# Patient Record
Sex: MALE | Race: WHITE | NOT HISPANIC OR LATINO | Employment: OTHER | URBAN - METROPOLITAN AREA
[De-identification: names, ages, dates, MRNs, and addresses within clinical notes are randomized per-mention and may not be internally consistent; named-entity substitution may affect disease eponyms.]

---

## 2024-02-09 ENCOUNTER — HOSPITAL ENCOUNTER (OUTPATIENT)
Facility: CLINIC | Age: 79
End: 2024-02-09
Attending: ORTHOPAEDIC SURGERY | Admitting: ORTHOPAEDIC SURGERY
Payer: COMMERCIAL

## 2024-02-09 VITALS — BODY MASS INDEX: 23.7 KG/M2 | WEIGHT: 160 LBS | HEIGHT: 69 IN

## 2024-02-09 RX ORDER — LISINOPRIL 10 MG/1
10 TABLET ORAL DAILY
COMMUNITY
End: 2024-03-01 | Stop reason: HOSPADM

## 2024-02-09 RX ORDER — SIMVASTATIN 20 MG
20 TABLET ORAL AT BEDTIME
COMMUNITY
End: 2024-03-01 | Stop reason: HOSPADM

## 2024-02-09 RX ORDER — TAMSULOSIN HYDROCHLORIDE 0.4 MG/1
0.4 CAPSULE ORAL DAILY
COMMUNITY
End: 2024-03-01 | Stop reason: HOSPADM

## 2024-02-14 NOTE — PROGRESS NOTES
Discharge plan according to [facility]:     02/09/24 1326   Discharge Planning   Patient/Family Anticipates Transition to home   Concerns to be Addressed all concerns addressed in this encounter   Living Arrangements   People in Home spouse   Type of Residence Private Residence   Is your private residence a single family home or apartment? Single family home   Stair Railings, Within Home, Primary railings safe and in good condition   Bathroom Shower/Tub Walk-in shower   Equipment Currently Used at Home cane, straight   Support System   Support Systems Spouse/Significant Other   Do you have someone available to stay with you one or two nights once you are home? Yes

## 2024-02-26 RX ORDER — TRANEXAMIC ACID 650 MG/1
1950 TABLET ORAL ONCE
Status: CANCELLED | OUTPATIENT
Start: 2024-02-26 | End: 2024-02-26

## 2024-02-26 RX ORDER — ACETAMINOPHEN 325 MG/1
975 TABLET ORAL ONCE
Status: CANCELLED | OUTPATIENT
Start: 2024-02-26 | End: 2024-02-26

## 2024-02-26 RX ORDER — CEFAZOLIN SODIUM/WATER 2 G/20 ML
2 SYRINGE (ML) INTRAVENOUS SEE ADMIN INSTRUCTIONS
Status: CANCELLED | OUTPATIENT
Start: 2024-02-26

## 2024-02-26 RX ORDER — CEFAZOLIN SODIUM/WATER 2 G/20 ML
2 SYRINGE (ML) INTRAVENOUS
Status: CANCELLED | OUTPATIENT
Start: 2024-02-26

## 2024-04-17 RX ORDER — LISINOPRIL 10 MG/1
10 TABLET ORAL AT BEDTIME
COMMUNITY

## 2024-04-17 RX ORDER — TAMSULOSIN HYDROCHLORIDE 0.4 MG/1
0.8 CAPSULE ORAL DAILY
COMMUNITY

## 2024-04-17 RX ORDER — SIMVASTATIN 20 MG
20 TABLET ORAL AT BEDTIME
COMMUNITY

## 2024-04-22 NOTE — PROGRESS NOTES
Discharge plan according to [facility]:       04/17/24 1226   Discharge Planning   Patient/Family Anticipates Transition to home   Living Arrangements   People in Home spouse   Type of Residence Private Residence   Is your private residence a single family home or apartment? Single family home   Number of Stairs, Within Home, Primary ten   Stair Railings, Within Home, Primary railings safe and in good condition   Bathroom Shower/Tub Walk-in shower   Equipment Currently Used at Home none   Support System   Support Systems Spouse/Significant Other   Do you have someone available to stay with you one or two nights once you are home? Yes

## 2024-05-10 NOTE — TREATMENT PLAN
Orthopedic Surgery Pre-Op Plan: Vik Veliz  pre-op review. This is NOT an H&P   Surgeon: Dr. Oden    [facility]  Name of Surgery: Left Direct Anterior Total Hip Arthroplasty   Date of Surgery: 5/13/24  H&P: Completed on 4/23/24 by PAOLA Navarrete, CNP at [facility].   History of ASA, NSAIDS, vitamin and/or herbal supplements, GLP-1 Agonist or SGLT Inhibitor medication taken within 10 days?: No  History of blood thinners?: No    Plan:   1) Discharge Plan: Home POD 1 with assist of Spouse. Please see Discharge Planning section near bottom of this note for further details.     2) Premature Atrial Contractions (PAC's): Reviewed recent Cardiology visit note with Dr. Radha Graham at [facility] from 4/4/24: patient has history of PAC's with previous burden of 6% on Holter in 2018. He is asymptomatic. Dr. Graham recommended Echocardiogram and Event Monitor test but said that these tests do NOT need to be done before upcoming hip surgery. Echo and event monitor tests were both completed (results below) and showed no significant change from previous. Patient felt to be medically optimized for surgery.     Echocardiogram 4/2/24:  Summary    1. Normal LV size with normal wall thickness. Calculated biplane LVEF 60-65%. No regional wall motion abnormalities.    2. Normal RV size with normal systolic function. Estimated PASP is normal.    3. Biatrial enlargement.    4. The aortic valve is trileaflet with mild regurgitation.    5. The aortic root and ascending aorta are mildly dilated at 4.2 cm and 4.1 cm, respectively.    6. Compared to the prior study from 1/25/2019, the current study reveals no significant change.    Cardiac Event Monitor 5/9/24:  Sinus rhythm - range  bpm  No symptom related events  Confirmed by Demarcus Vásquez ([id number]) on 5/9/2024 9:25:19 AM    3) Hyperlipidemia: On simvastatin.    4) Hypertension: Appears well-controlled on  lisinopril.  Patient was instructed to hold lisinopril on the morning of surgery.     5) Benign Prostatic Hyperplasia (BPH): On tamsulosin.  Patient is at increased risk for postop urinary retention.  I recommend continuing on tamsulosin perioperatively and close monitoring for urine retention with frequent bladder scanning as needed.     Patient appears medically optimized for upcoming surgery. I would recommend Hospitalist Consult to assist with medical management. Please call me below with any questions on this patient.       Review of Systems Notable for: Premature atrial contractions-cleared by PCP and cardiology for surgery, hyperlipidemia, hypertension, benign prostatic hyperplasia.     Past Medical History:   Past Medical History:   Diagnosis Date    Arthritis     BPH (benign prostatic hyperplasia)     Complication of anesthesia     Hypertension      History reviewed. No pertinent surgical history.    Current Medications:  Patient's Medications   New Prescriptions    No medications on file   Previous Medications    LISINOPRIL (ZESTRIL) 10 MG TABLET    Take 10 mg by mouth daily    SIMVASTATIN (ZOCOR) 20 MG TABLET    Take 20 mg by mouth at bedtime    TAMSULOSIN (FLOMAX) 0.4 MG CAPSULE    Take 0.4 mg by mouth daily   Modified Medications    No medications on file   Discontinued Medications    No medications on file       ALLERGIES:  No Known Allergies    Social History  Social History     Tobacco Use    Smoking status: Never    Smokeless tobacco: Never   Substance Use Topics    Alcohol use: Yes     Comment: 14 etoh per week    Drug use: Never       Any Abnormal Recent Diagnostics? Yes  Blood glucose 107 on 4/23/2024: No known history of prediabetes or diabetes.  Last hemoglobin A1c 5.2 on 2/19/2024.    Discharge Planning:   Discharge plan according to [facility]:     Home POD 1 with assist of Spouse     04/17/24 1226   Discharge Planning   Patient/Family Anticipates Transition to home   Living  Arrangements   People in Home spouse   Type of Residence Private Residence   Is your private residence a single family home or apartment? Single family home   Number of Stairs, Within Home, Primary ten   Stair Railings, Within Home, Primary railings safe and in good condition   Bathroom Shower/Tub Walk-in shower   Equipment Currently Used at Home none   Support System   Support Systems Spouse/Significant Other   Do you have someone available to stay with you one or two nights once you are home? Yes       PAOLA Shah, CNP   Advanced Practice Nurse Navigator- Orthopedics  [facility]   Phone: [phone]

## 2024-05-11 ENCOUNTER — ANESTHESIA EVENT (OUTPATIENT)
Dept: SURGERY | Facility: CLINIC | Age: 79
End: 2024-05-11
Payer: COMMERCIAL

## 2024-05-13 ENCOUNTER — APPOINTMENT (OUTPATIENT)
Dept: RADIOLOGY | Facility: CLINIC | Age: 79
End: 2024-05-13
Payer: COMMERCIAL

## 2024-05-13 ENCOUNTER — HOSPITAL ENCOUNTER (OUTPATIENT)
Facility: CLINIC | Age: 79
Discharge: HOME | End: 2024-05-14
Attending: ORTHOPAEDIC SURGERY | Admitting: ORTHOPAEDIC SURGERY
Payer: COMMERCIAL

## 2024-05-13 ENCOUNTER — ANESTHESIA (OUTPATIENT)
Dept: SURGERY | Facility: CLINIC | Age: 79
End: 2024-05-13
Payer: COMMERCIAL

## 2024-05-13 ENCOUNTER — DOCUMENTATION ONLY (OUTPATIENT)
Dept: OTHER | Facility: CLINIC | Age: 79
End: 2024-05-13
Payer: COMMERCIAL

## 2024-05-13 ENCOUNTER — APPOINTMENT (OUTPATIENT)
Dept: PHYSICAL THERAPY | Facility: CLINIC | Age: 79
End: 2024-05-13
Payer: COMMERCIAL

## 2024-05-13 DIAGNOSIS — Z47.1 AFTERCARE FOLLOWING LEFT HIP JOINT REPLACEMENT SURGERY: Primary | ICD-10-CM

## 2024-05-13 DIAGNOSIS — Z96.642 AFTERCARE FOLLOWING LEFT HIP JOINT REPLACEMENT SURGERY: Primary | ICD-10-CM

## 2024-05-13 PROBLEM — M16.12 OSTEOARTHRITIS OF LEFT HIP: Status: ACTIVE | Noted: 2024-05-13

## 2024-05-13 PROCEDURE — 97530 THERAPEUTIC ACTIVITIES: CPT | Mod: GP

## 2024-05-13 PROCEDURE — 250N000013 HC RX MED GY IP 250 OP 250 PS 637: Performed by: EMERGENCY MEDICINE

## 2024-05-13 PROCEDURE — 360N000077 HC SURGERY LEVEL 4, PER MIN: Performed by: ORTHOPAEDIC SURGERY

## 2024-05-13 PROCEDURE — 250N000013 HC RX MED GY IP 250 OP 250 PS 637

## 2024-05-13 PROCEDURE — 258N000003 HC RX IP 258 OP 636: Performed by: ANESTHESIOLOGY

## 2024-05-13 PROCEDURE — 250N000009 HC RX 250

## 2024-05-13 PROCEDURE — 250N000011 HC RX IP 250 OP 636: Performed by: STUDENT IN AN ORGANIZED HEALTH CARE EDUCATION/TRAINING PROGRAM

## 2024-05-13 PROCEDURE — 999N000141 HC STATISTIC PRE-PROCEDURE NURSING ASSESSMENT: Performed by: ORTHOPAEDIC SURGERY

## 2024-05-13 PROCEDURE — 97161 PT EVAL LOW COMPLEX 20 MIN: CPT | Mod: GP

## 2024-05-13 PROCEDURE — 999N000065 XR PELVIS AND HIP PORTABLE LEFT 1 VIEW

## 2024-05-13 PROCEDURE — C1776 JOINT DEVICE (IMPLANTABLE): HCPCS | Performed by: ORTHOPAEDIC SURGERY

## 2024-05-13 PROCEDURE — 258N000003 HC RX IP 258 OP 636

## 2024-05-13 PROCEDURE — 250N000011 HC RX IP 250 OP 636

## 2024-05-13 PROCEDURE — 99222 1ST HOSP IP/OBS MODERATE 55: CPT | Performed by: EMERGENCY MEDICINE

## 2024-05-13 PROCEDURE — 272N000001 HC OR GENERAL SUPPLY STERILE: Performed by: ORTHOPAEDIC SURGERY

## 2024-05-13 PROCEDURE — 710N000010 HC RECOVERY PHASE 1, LEVEL 2, PER MIN: Performed by: ORTHOPAEDIC SURGERY

## 2024-05-13 PROCEDURE — 370N000017 HC ANESTHESIA TECHNICAL FEE, PER MIN: Performed by: ORTHOPAEDIC SURGERY

## 2024-05-13 PROCEDURE — 250N000009 HC RX 250: Performed by: ORTHOPAEDIC SURGERY

## 2024-05-13 DEVICE — IMPLANTABLE DEVICE: Type: IMPLANTABLE DEVICE | Site: HIP | Status: FUNCTIONAL

## 2024-05-13 DEVICE — CERAMIC V40 FEMORAL HEAD
Type: IMPLANTABLE DEVICE | Site: HIP | Status: FUNCTIONAL
Brand: BIOLOX

## 2024-05-13 DEVICE — HIP STEM - HIGH OFFSET
Type: IMPLANTABLE DEVICE | Site: HIP | Status: FUNCTIONAL
Brand: INSIGNIA

## 2024-05-13 RX ORDER — HYDROXYZINE HYDROCHLORIDE 10 MG/1
10 TABLET, FILM COATED ORAL EVERY 6 HOURS PRN
Qty: 30 TABLET | Refills: 0 | Status: SHIPPED | OUTPATIENT
Start: 2024-05-13

## 2024-05-13 RX ORDER — ASPIRIN 81 MG/1
81 TABLET ORAL 2 TIMES DAILY
Status: DISCONTINUED | OUTPATIENT
Start: 2024-05-13 | End: 2024-05-14 | Stop reason: HOSPADM

## 2024-05-13 RX ORDER — ONDANSETRON 2 MG/ML
4 INJECTION INTRAMUSCULAR; INTRAVENOUS EVERY 30 MIN PRN
Status: DISCONTINUED | OUTPATIENT
Start: 2024-05-13 | End: 2024-05-13 | Stop reason: HOSPADM

## 2024-05-13 RX ORDER — TAMSULOSIN HYDROCHLORIDE 0.4 MG/1
0.8 CAPSULE ORAL EVERY EVENING
Status: DISCONTINUED | OUTPATIENT
Start: 2024-05-13 | End: 2024-05-14 | Stop reason: HOSPADM

## 2024-05-13 RX ORDER — SODIUM CHLORIDE, SODIUM LACTATE, POTASSIUM CHLORIDE, CALCIUM CHLORIDE 600; 310; 30; 20 MG/100ML; MG/100ML; MG/100ML; MG/100ML
INJECTION, SOLUTION INTRAVENOUS CONTINUOUS
Status: DISCONTINUED | OUTPATIENT
Start: 2024-05-13 | End: 2024-05-13 | Stop reason: HOSPADM

## 2024-05-13 RX ORDER — TRANEXAMIC ACID 650 MG/1
1950 TABLET ORAL ONCE
Status: COMPLETED | OUTPATIENT
Start: 2024-05-13 | End: 2024-05-13

## 2024-05-13 RX ORDER — ACETAMINOPHEN 325 MG/1
975 TABLET ORAL ONCE
Status: COMPLETED | OUTPATIENT
Start: 2024-05-13 | End: 2024-05-13

## 2024-05-13 RX ORDER — ACETAMINOPHEN 325 MG/1
975 TABLET ORAL EVERY 8 HOURS
Qty: 27 TABLET | Refills: 0 | Status: DISCONTINUED | OUTPATIENT
Start: 2024-05-13 | End: 2024-05-14 | Stop reason: HOSPADM

## 2024-05-13 RX ORDER — CEFAZOLIN SODIUM 1 G/3ML
1 INJECTION, POWDER, FOR SOLUTION INTRAMUSCULAR; INTRAVENOUS EVERY 8 HOURS
Qty: 10 ML | Refills: 0 | Status: COMPLETED | OUTPATIENT
Start: 2024-05-13 | End: 2024-05-13

## 2024-05-13 RX ORDER — PROPOFOL 10 MG/ML
INJECTION, EMULSION INTRAVENOUS CONTINUOUS PRN
Status: DISCONTINUED | OUTPATIENT
Start: 2024-05-13 | End: 2024-05-13

## 2024-05-13 RX ORDER — OXYCODONE HYDROCHLORIDE 5 MG/1
10 TABLET ORAL
Status: DISCONTINUED | OUTPATIENT
Start: 2024-05-13 | End: 2024-05-13 | Stop reason: HOSPADM

## 2024-05-13 RX ORDER — NALOXONE HYDROCHLORIDE 0.4 MG/ML
0.1 INJECTION, SOLUTION INTRAMUSCULAR; INTRAVENOUS; SUBCUTANEOUS
Status: DISCONTINUED | OUTPATIENT
Start: 2024-05-13 | End: 2024-05-13 | Stop reason: HOSPADM

## 2024-05-13 RX ORDER — CEFADROXIL 500 MG/1
500 CAPSULE ORAL 2 TIMES DAILY
Qty: 14 CAPSULE | Refills: 0 | Status: SHIPPED | OUTPATIENT
Start: 2024-05-13 | End: 2024-05-20

## 2024-05-13 RX ORDER — ACETAMINOPHEN 325 MG/1
650 TABLET ORAL EVERY 4 HOURS PRN
Qty: 100 TABLET | Refills: 0 | Status: SHIPPED | OUTPATIENT
Start: 2024-05-13

## 2024-05-13 RX ORDER — ONDANSETRON 2 MG/ML
INJECTION INTRAMUSCULAR; INTRAVENOUS PRN
Status: DISCONTINUED | OUTPATIENT
Start: 2024-05-13 | End: 2024-05-13

## 2024-05-13 RX ORDER — NALOXONE HYDROCHLORIDE 0.4 MG/ML
0.2 INJECTION, SOLUTION INTRAMUSCULAR; INTRAVENOUS; SUBCUTANEOUS
Status: DISCONTINUED | OUTPATIENT
Start: 2024-05-13 | End: 2024-05-14 | Stop reason: HOSPADM

## 2024-05-13 RX ORDER — LIDOCAINE 40 MG/G
CREAM TOPICAL
Status: DISCONTINUED | OUTPATIENT
Start: 2024-05-13 | End: 2024-05-13 | Stop reason: HOSPADM

## 2024-05-13 RX ORDER — POLYETHYLENE GLYCOL 3350 17 G/17G
17 POWDER, FOR SOLUTION ORAL DAILY
Status: DISCONTINUED | OUTPATIENT
Start: 2024-05-14 | End: 2024-05-14 | Stop reason: HOSPADM

## 2024-05-13 RX ORDER — OXYCODONE HYDROCHLORIDE 5 MG/1
5 TABLET ORAL
Status: DISCONTINUED | OUTPATIENT
Start: 2024-05-13 | End: 2024-05-13 | Stop reason: HOSPADM

## 2024-05-13 RX ORDER — FENTANYL CITRATE 50 UG/ML
25 INJECTION, SOLUTION INTRAMUSCULAR; INTRAVENOUS EVERY 5 MIN PRN
Status: DISCONTINUED | OUTPATIENT
Start: 2024-05-13 | End: 2024-05-13 | Stop reason: HOSPADM

## 2024-05-13 RX ORDER — AMOXICILLIN 250 MG
1-2 CAPSULE ORAL 2 TIMES DAILY
Qty: 30 TABLET | Refills: 0 | Status: SHIPPED | OUTPATIENT
Start: 2024-05-13

## 2024-05-13 RX ORDER — ASPIRIN 81 MG/1
81 TABLET ORAL 2 TIMES DAILY
Qty: 60 TABLET | Refills: 0 | Status: SHIPPED | OUTPATIENT
Start: 2024-05-13

## 2024-05-13 RX ORDER — OXYCODONE HYDROCHLORIDE 5 MG/1
10 TABLET ORAL EVERY 4 HOURS PRN
Status: DISCONTINUED | OUTPATIENT
Start: 2024-05-13 | End: 2024-05-14 | Stop reason: HOSPADM

## 2024-05-13 RX ORDER — SIMVASTATIN 20 MG
20 TABLET ORAL AT BEDTIME
Status: DISCONTINUED | OUTPATIENT
Start: 2024-05-13 | End: 2024-05-14 | Stop reason: HOSPADM

## 2024-05-13 RX ORDER — OXYCODONE HYDROCHLORIDE 5 MG/1
5 TABLET ORAL EVERY 4 HOURS PRN
Qty: 20 TABLET | Refills: 0 | Status: SHIPPED | OUTPATIENT
Start: 2024-05-13

## 2024-05-13 RX ORDER — BISACODYL 10 MG
10 SUPPOSITORY, RECTAL RECTAL DAILY PRN
Status: DISCONTINUED | OUTPATIENT
Start: 2024-05-16 | End: 2024-05-14 | Stop reason: HOSPADM

## 2024-05-13 RX ORDER — AMOXICILLIN 250 MG
1 CAPSULE ORAL 2 TIMES DAILY
Status: DISCONTINUED | OUTPATIENT
Start: 2024-05-13 | End: 2024-05-14 | Stop reason: HOSPADM

## 2024-05-13 RX ORDER — LISINOPRIL 10 MG/1
10 TABLET ORAL AT BEDTIME
Status: DISCONTINUED | OUTPATIENT
Start: 2024-05-13 | End: 2024-05-14 | Stop reason: HOSPADM

## 2024-05-13 RX ORDER — ONDANSETRON 2 MG/ML
4 INJECTION INTRAMUSCULAR; INTRAVENOUS EVERY 6 HOURS PRN
Status: DISCONTINUED | OUTPATIENT
Start: 2024-05-13 | End: 2024-05-14 | Stop reason: HOSPADM

## 2024-05-13 RX ORDER — DEXAMETHASONE SODIUM PHOSPHATE 4 MG/ML
4 INJECTION, SOLUTION INTRA-ARTICULAR; INTRALESIONAL; INTRAMUSCULAR; INTRAVENOUS; SOFT TISSUE
Status: DISCONTINUED | OUTPATIENT
Start: 2024-05-13 | End: 2024-05-13 | Stop reason: HOSPADM

## 2024-05-13 RX ORDER — HYDROMORPHONE HCL IN WATER/PF 6 MG/30 ML
0.2 PATIENT CONTROLLED ANALGESIA SYRINGE INTRAVENOUS EVERY 5 MIN PRN
Status: DISCONTINUED | OUTPATIENT
Start: 2024-05-13 | End: 2024-05-13 | Stop reason: HOSPADM

## 2024-05-13 RX ORDER — HYDROMORPHONE HCL IN WATER/PF 6 MG/30 ML
0.4 PATIENT CONTROLLED ANALGESIA SYRINGE INTRAVENOUS EVERY 5 MIN PRN
Status: DISCONTINUED | OUTPATIENT
Start: 2024-05-13 | End: 2024-05-13 | Stop reason: HOSPADM

## 2024-05-13 RX ORDER — LIDOCAINE HYDROCHLORIDE 10 MG/ML
INJECTION, SOLUTION INFILTRATION; PERINEURAL PRN
Status: DISCONTINUED | OUTPATIENT
Start: 2024-05-13 | End: 2024-05-13

## 2024-05-13 RX ORDER — HYDROMORPHONE HCL IN WATER/PF 6 MG/30 ML
0.2 PATIENT CONTROLLED ANALGESIA SYRINGE INTRAVENOUS
Status: DISCONTINUED | OUTPATIENT
Start: 2024-05-13 | End: 2024-05-14 | Stop reason: HOSPADM

## 2024-05-13 RX ORDER — LORAZEPAM 2 MG/ML
.5-1 INJECTION INTRAMUSCULAR
Status: DISCONTINUED | OUTPATIENT
Start: 2024-05-13 | End: 2024-05-13 | Stop reason: HOSPADM

## 2024-05-13 RX ORDER — ACETAMINOPHEN 325 MG/1
650 TABLET ORAL EVERY 4 HOURS PRN
Status: DISCONTINUED | OUTPATIENT
Start: 2024-05-16 | End: 2024-05-14 | Stop reason: HOSPADM

## 2024-05-13 RX ORDER — LIDOCAINE 40 MG/G
CREAM TOPICAL
Status: DISCONTINUED | OUTPATIENT
Start: 2024-05-13 | End: 2024-05-14 | Stop reason: HOSPADM

## 2024-05-13 RX ORDER — ONDANSETRON 4 MG/1
4 TABLET, ORALLY DISINTEGRATING ORAL EVERY 30 MIN PRN
Status: DISCONTINUED | OUTPATIENT
Start: 2024-05-13 | End: 2024-05-13 | Stop reason: HOSPADM

## 2024-05-13 RX ORDER — CEFAZOLIN SODIUM/WATER 2 G/20 ML
2 SYRINGE (ML) INTRAVENOUS SEE ADMIN INSTRUCTIONS
Status: DISCONTINUED | OUTPATIENT
Start: 2024-05-13 | End: 2024-05-13 | Stop reason: HOSPADM

## 2024-05-13 RX ORDER — FENTANYL CITRATE 50 UG/ML
25-100 INJECTION, SOLUTION INTRAMUSCULAR; INTRAVENOUS
Status: DISCONTINUED | OUTPATIENT
Start: 2024-05-13 | End: 2024-05-13 | Stop reason: HOSPADM

## 2024-05-13 RX ORDER — ONDANSETRON 4 MG/1
4 TABLET, ORALLY DISINTEGRATING ORAL EVERY 6 HOURS PRN
Status: DISCONTINUED | OUTPATIENT
Start: 2024-05-13 | End: 2024-05-14 | Stop reason: HOSPADM

## 2024-05-13 RX ORDER — NALOXONE HYDROCHLORIDE 0.4 MG/ML
0.4 INJECTION, SOLUTION INTRAMUSCULAR; INTRAVENOUS; SUBCUTANEOUS
Status: DISCONTINUED | OUTPATIENT
Start: 2024-05-13 | End: 2024-05-14 | Stop reason: HOSPADM

## 2024-05-13 RX ORDER — OXYCODONE HYDROCHLORIDE 5 MG/1
5 TABLET ORAL EVERY 4 HOURS PRN
Status: DISCONTINUED | OUTPATIENT
Start: 2024-05-13 | End: 2024-05-14 | Stop reason: HOSPADM

## 2024-05-13 RX ORDER — HYDROMORPHONE HYDROCHLORIDE 1 MG/ML
0.5 INJECTION, SOLUTION INTRAMUSCULAR; INTRAVENOUS; SUBCUTANEOUS
Status: DISCONTINUED | OUTPATIENT
Start: 2024-05-13 | End: 2024-05-14 | Stop reason: HOSPADM

## 2024-05-13 RX ORDER — PROCHLORPERAZINE MALEATE 5 MG
5 TABLET ORAL EVERY 6 HOURS PRN
Status: DISCONTINUED | OUTPATIENT
Start: 2024-05-13 | End: 2024-05-14 | Stop reason: HOSPADM

## 2024-05-13 RX ORDER — KETOROLAC TROMETHAMINE 30 MG/ML
15 INJECTION, SOLUTION INTRAMUSCULAR; INTRAVENOUS
Status: DISCONTINUED | OUTPATIENT
Start: 2024-05-13 | End: 2024-05-13 | Stop reason: HOSPADM

## 2024-05-13 RX ORDER — FENTANYL CITRATE 50 UG/ML
50 INJECTION, SOLUTION INTRAMUSCULAR; INTRAVENOUS EVERY 5 MIN PRN
Status: DISCONTINUED | OUTPATIENT
Start: 2024-05-13 | End: 2024-05-13 | Stop reason: HOSPADM

## 2024-05-13 RX ORDER — SODIUM CHLORIDE, SODIUM LACTATE, POTASSIUM CHLORIDE, CALCIUM CHLORIDE 600; 310; 30; 20 MG/100ML; MG/100ML; MG/100ML; MG/100ML
INJECTION, SOLUTION INTRAVENOUS CONTINUOUS
Status: DISCONTINUED | OUTPATIENT
Start: 2024-05-13 | End: 2024-05-14 | Stop reason: HOSPADM

## 2024-05-13 RX ORDER — BUPIVACAINE HYDROCHLORIDE 7.5 MG/ML
INJECTION, SOLUTION INTRASPINAL
Status: COMPLETED | OUTPATIENT
Start: 2024-05-13 | End: 2024-05-13

## 2024-05-13 RX ORDER — DEXAMETHASONE SODIUM PHOSPHATE 10 MG/ML
INJECTION, SOLUTION INTRAMUSCULAR; INTRAVENOUS PRN
Status: DISCONTINUED | OUTPATIENT
Start: 2024-05-13 | End: 2024-05-13

## 2024-05-13 RX ORDER — CEFAZOLIN SODIUM/WATER 2 G/20 ML
2 SYRINGE (ML) INTRAVENOUS
Status: COMPLETED | OUTPATIENT
Start: 2024-05-13 | End: 2024-05-13

## 2024-05-13 RX ORDER — MEPERIDINE HYDROCHLORIDE 25 MG/ML
12.5 INJECTION INTRAMUSCULAR; INTRAVENOUS; SUBCUTANEOUS EVERY 5 MIN PRN
Status: DISCONTINUED | OUTPATIENT
Start: 2024-05-13 | End: 2024-05-13 | Stop reason: HOSPADM

## 2024-05-13 RX ADMIN — SIMVASTATIN 20 MG: 20 TABLET, FILM COATED ORAL at 21:20

## 2024-05-13 RX ADMIN — PROPOFOL 100 MCG/KG/MIN: 10 INJECTION, EMULSION INTRAVENOUS at 07:22

## 2024-05-13 RX ADMIN — ASPIRIN 81 MG: 81 TABLET, COATED ORAL at 21:20

## 2024-05-13 RX ADMIN — PHENYLEPHRINE HYDROCHLORIDE 0.3 MCG/KG/MIN: 10 INJECTION INTRAVENOUS at 08:07

## 2024-05-13 RX ADMIN — SODIUM CHLORIDE, POTASSIUM CHLORIDE, SODIUM LACTATE AND CALCIUM CHLORIDE: 600; 310; 30; 20 INJECTION, SOLUTION INTRAVENOUS at 06:38

## 2024-05-13 RX ADMIN — ONDANSETRON 4 MG: 2 INJECTION INTRAMUSCULAR; INTRAVENOUS at 08:31

## 2024-05-13 RX ADMIN — DEXAMETHASONE SODIUM PHOSPHATE 4 MG: 10 INJECTION, SOLUTION INTRAMUSCULAR; INTRAVENOUS at 07:37

## 2024-05-13 RX ADMIN — LISINOPRIL 10 MG: 10 TABLET ORAL at 21:20

## 2024-05-13 RX ADMIN — Medication 2 G: at 07:10

## 2024-05-13 RX ADMIN — TRANEXAMIC ACID 1950 MG: 650 TABLET ORAL at 06:07

## 2024-05-13 RX ADMIN — CEFAZOLIN 1 G: 1 INJECTION, POWDER, FOR SOLUTION INTRAMUSCULAR; INTRAVENOUS at 22:17

## 2024-05-13 RX ADMIN — TAMSULOSIN HYDROCHLORIDE 0.8 MG: 0.4 CAPSULE ORAL at 21:19

## 2024-05-13 RX ADMIN — SENNOSIDES AND DOCUSATE SODIUM 1 TABLET: 8.6; 5 TABLET ORAL at 13:17

## 2024-05-13 RX ADMIN — SENNOSIDES AND DOCUSATE SODIUM 1 TABLET: 8.6; 5 TABLET ORAL at 21:19

## 2024-05-13 RX ADMIN — LIDOCAINE HYDROCHLORIDE 3 ML: 10 INJECTION, SOLUTION INFILTRATION; PERINEURAL at 07:22

## 2024-05-13 RX ADMIN — SODIUM CHLORIDE, POTASSIUM CHLORIDE, SODIUM LACTATE AND CALCIUM CHLORIDE: 600; 310; 30; 20 INJECTION, SOLUTION INTRAVENOUS at 13:16

## 2024-05-13 RX ADMIN — ASPIRIN 81 MG: 81 TABLET, COATED ORAL at 13:17

## 2024-05-13 RX ADMIN — BUPIVACAINE HYDROCHLORIDE IN DEXTROSE 1.7 ML: 7.5 INJECTION, SOLUTION SUBARACHNOID at 07:22

## 2024-05-13 RX ADMIN — CEFAZOLIN 1 G: 1 INJECTION, POWDER, FOR SOLUTION INTRAMUSCULAR; INTRAVENOUS at 14:10

## 2024-05-13 RX ADMIN — ACETAMINOPHEN 975 MG: 325 TABLET ORAL at 21:19

## 2024-05-13 RX ADMIN — PROPOFOL 50 MG: 10 INJECTION, EMULSION INTRAVENOUS at 08:45

## 2024-05-13 RX ADMIN — ACETAMINOPHEN 975 MG: 325 TABLET ORAL at 06:07

## 2024-05-13 RX ADMIN — ACETAMINOPHEN 975 MG: 325 TABLET ORAL at 13:17

## 2024-05-13 ASSESSMENT — ACTIVITIES OF DAILY LIVING (ADL)
ADLS_ACUITY_SCORE: 28
ADLS_ACUITY_SCORE: 28
ADLS_ACUITY_SCORE: 25
ADLS_ACUITY_SCORE: 28
ADLS_ACUITY_SCORE: 25
ADLS_ACUITY_SCORE: 28
ADLS_ACUITY_SCORE: 25
ADLS_ACUITY_SCORE: 28
ADLS_ACUITY_SCORE: 22
ADLS_ACUITY_SCORE: 25

## 2024-05-13 NOTE — ANESTHESIA PROCEDURE NOTES
"Intrathecal injection Procedure Note    Pre-Procedure   Staff -        Anesthesiologist:  Ham Zepeda MD       Performed By: anesthesiologist       Location: OR       Procedure Start/Stop Times: 5/13/2024 7:19 AM and 5/13/2024 7:22 AM       Pre-Anesthestic Checklist: patient identified, IV checked, risks and benefits discussed, informed consent, monitors and equipment checked, pre-op evaluation and at physician/surgeon's request  Timeout:       Correct Patient: Yes        Correct Procedure: Yes        Correct Site: Yes        Correct Position: Yes   Procedure Documentation  Procedure: intrathecal injection       Patient Position: sitting       Patient Prep/Sterile Barriers: sterile gloves, mask       Skin prep: Chloraprep (midline approach).       Needle Gauge: 24.        Needle Length (Inches): 4        Spinal Needle Type: Pencan       Introducer used       # of attempts: 1 and  # of redirects:  1    Assessment/Narrative         Paresthesias: No.       CSF fluid: clear.    Medication(s) Administered   0.75% Hyperbaric Bupivacaine (Intrathecal) - Intrathecal   1.7 mL - 5/13/2024 7:22:00 AM  Medication Administration Time: 5/13/2024 7:19 AM      FOR [facility] ([facility]) ONLY:   Pain Team Contact information: please page the Pain Team Via meevl. Search \"Pain\". During daytime hours, please page the attending first. At night please page the resident first.      "

## 2024-05-13 NOTE — ANESTHESIA POSTPROCEDURE EVALUATION
Patient: Vik Veliz    Procedure: Procedure(s):  LEFT DIRECT ANTERIOR TOTAL HIP ARTHROPLASTY       Anesthesia Type:  Spinal    Note:     Postop Pain Control: Uneventful            Sign Out: Well controlled pain   PONV: No   Neuro/Psych: Uneventful            Sign Out: Acceptable/Baseline neuro status   Airway/Respiratory: Uneventful            Sign Out: Acceptable/Baseline resp. status   CV/Hemodynamics: Uneventful            Sign Out: Acceptable CV status; No obvious hypovolemia; No obvious fluid overload   Other NRE: NONE   DID A NON-ROUTINE EVENT OCCUR? No           Last vitals:  Vitals Value Taken Time   /56 05/13/24 0930   Temp 35.1  C (95.18  F) 05/13/24 0937   Pulse 60 05/13/24 0937   Resp 18 05/13/24 0937   SpO2 97 % 05/13/24 0937   Vitals shown include unfiled device data.    Electronically Signed By: Ham Zepeda MD  May 13, 2024  9:48 AM

## 2024-05-13 NOTE — PHARMACY-ADMISSION MEDICATION HISTORY
Pharmacist JANET Medication History    Admission medication history is complete. The information provided in this note is only as accurate as the sources available at the time of the update.    Medication reconciliation/reorder completed by provider prior to medication history? No    Information Source(s): Patient and Clinic records and Care Everywhere via in-person    Pertinent Information: N/A    Allergies reviewed with patient and updates made in EHR: yes    Medications available for use during hospital stay: None.      Medication History Completed By: Jorge Luis Laura [facility] 5/13/2024 6:33 AM    PTA Med List   Medication Sig Last Dose    lisinopril (ZESTRIL) 10 MG tablet Take 10 mg by mouth at bedtime 5/12/2024 at PM    simvastatin (ZOCOR) 20 MG tablet Take 20 mg by mouth at bedtime 5/12/2024 at PM    tamsulosin (FLOMAX) 0.4 MG capsule Take 0.8 mg by mouth daily 5/12/2024 at AM

## 2024-05-13 NOTE — ANESTHESIA PREPROCEDURE EVALUATION
"Anesthesia Pre-Procedure Evaluation    Patient: Vik Veliz   MRN: [id number] : 1945        Procedure : Procedure(s):  LEFT DIRECT ANTERIOR TOTAL HIP ARTHROPLASTY          Past Medical History:   Diagnosis Date    Arthritis     BPH (benign prostatic hyperplasia)     Complication of anesthesia     Hypertension       History reviewed. No pertinent surgical history.   No Known Allergies   Social History     Tobacco Use    Smoking status: Never    Smokeless tobacco: Never   Substance Use Topics    Alcohol use: Yes     Comment: 14 etoh per week      Wt Readings from Last 1 Encounters:   No data found for Wt        Anesthesia Evaluation            ROS/MED HX  ENT/Pulmonary:  - neg pulmonary ROS     Neurologic:  - neg neurologic ROS     Cardiovascular: Comment: Hx of PSVT  - neg cardiovascular ROS   (+)  hypertension- -   -  - -                                      METS/Exercise Tolerance: >4 METS    Hematologic:  - neg hematologic  ROS     Musculoskeletal:  - neg musculoskeletal ROS (+)  arthritis,             GI/Hepatic:  - neg GI/hepatic ROS     Renal/Genitourinary:  - neg Renal ROS     Endo:  - neg endo ROS     Psychiatric/Substance Use:  - neg psychiatric ROS     Infectious Disease:  - neg infectious disease ROS     Malignancy:  - neg malignancy ROS     Other:  - neg other ROS          Physical Exam    Airway  airway exam normal      Mallampati: II   TM distance: > 3 FB    Mouth opening: > 3 cm    Respiratory Devices and Support         Dental       (+) Modest Abnormalities - crowns, retainers, 1 or 2 missing teeth      Cardiovascular   cardiovascular exam normal          Pulmonary   pulmonary exam normal                OUTSIDE LABS:  CBC: No results found for: \"WBC\", \"HGB\", \"HCT\", \"PLT\"  BMP: No results found for: \"NA\", \"POTASSIUM\", \"CHLORIDE\", \"CO2\", \"BUN\", \"CR\", \"GLC\"  COAGS: No results found for: \"PTT\", \"INR\", \"FIBR\"  POC: No results found for: \"BGM\", \"HCG\", \"HCGS\"  HEPATIC: No results found for: " "\"ALBUMIN\", \"PROTTOTAL\", \"ALT\", \"AST\", \"GGT\", \"ALKPHOS\", \"BILITOTAL\", \"BILIDIRECT\", \"MACK\"  OTHER: No results found for: \"PH\", \"LACT\", \"A1C\", \"MOLLY\", \"PHOS\", \"MAG\", \"LIPASE\", \"AMYLASE\", \"TSH\", \"T4\", \"T3\", \"CRP\", \"SED\"    Anesthesia Plan    ASA Status:  3    NPO Status:  NPO Appropriate    Anesthesia Type: Spinal.              Consents    Anesthesia Plan(s) and associated risks, benefits, and realistic alternatives discussed. Questions answered and patient/representative(s) expressed understanding.     - Discussed:     - Discussed with:  Patient            Postoperative Care       PONV prophylaxis: Ondansetron (or other 5HT-3), Dexamethasone or Solumedrol     Comments:               Ham Zepeda MD    I have reviewed the pertinent notes and labs in the chart from the past 30 days and (re)examined the patient.  Any updates or changes from those notes are reflected in this note.                  "

## 2024-05-13 NOTE — PROGRESS NOTES
05/13/24 7883   Appointment Info   Signing Clinician's Name / Credentials (PT) Yon Mayorga, PT, DPT   Quick Adds   Quick Adds Certification   Living Environment   People in Home spouse   Current Living Arrangements house   Home Accessibility stairs within home   Number of Stairs, Within Home, Primary ten   Self-Care   Usual Activity Tolerance good   Current Activity Tolerance moderate   Equipment Currently Used at Home cane, straight;walker, rolling   Fall history within last six months no   General Information   Onset of Illness/Injury or Date of Surgery 05/13/24   Referring Physician Dr. Oden   Patient/Family Therapy Goals Statement (PT) Improve overall mobility   Pertinent History of Current Problem (include personal factors and/or comorbidities that impact the POC) s/p L JAREK   Existing Precautions/Restrictions weight bearing   Weight-Bearing Status - LLE weight-bearing as tolerated   Weight-Bearing Status - RLE full weight-bearing   Range of Motion (ROM)   ROM Comment WFL, decreased LLE s/p JAREK   Strength (Manual Muscle Testing)   Strength Comments WFL   Bed Mobility   Bed Mobility supine-sit   Supine-Sit [place] (Bed Mobility) modified independence   Assistive Device (Bed Mobility) bed rails   Transfers   Transfers sit-stand transfer   Sit-Stand Transfer   Sit-Stand [place] (Transfers) supervision   Assistive Device (Sit-Stand Transfers) walker, front-wheeled   Gait/Stairs (Locomotion)   [place] Level (Gait) contact guard   Assistive Device (Gait) walker, front-wheeled   Distance in Feet (Gait) 10'   Pattern (Gait) step-through   Deviations/Abnormal Patterns (Gait) hang decreased;stride length decreased   Clinical Impression   Criteria for Skilled Therapeutic Intervention Yes, treatment indicated   PT Diagnosis (PT) impaired functional mobility   Influenced by the following impairments pain, decreased ROM, decreased strength   Functional limitations due to impairments gait, stairs,  transfers   Clinical Presentation (PT Evaluation Complexity) stable   Clinical Presentation Rationale pt presents as medically diagnosed   Clinical Decision Making (Complexity) low complexity   Planned Therapy Interventions (PT) gait training;home exercise program;patient/family education;ROM (range of motion);stair training;strengthening;transfer training   Risk & Benefits of therapy have been explained care plan/treatment goals reviewed;patient   PT Total Evaluation Time   PT Eval, Low Complexity Minutes (17177) 10   Therapy Certification   Start of care date 05/13/24   Certification date from 05/13/24   Certification date to 06/13/24   Medical Diagnosis s/p JAREK   Physical Therapy Goals   PT Frequency Daily   PT Predicted Duration/Target Date for Goal Attainment 05/15/24   PT Goals PT Goal 1;Transfers;Gait;Stairs   PT: Transfers Modified independent;Sit to/from stand;Assistive device   PT: Gait Modified independent;Rolling walker;150 feet   PT: Stairs Supervision/stand-by assist;4 stairs;Rail on both sides   PT: Goal 1 Independent with JAREK HEP   Interventions   Interventions Quick Adds Therapeutic Activity;Gait Training   Therapeutic Activity   Therapeutic Activities: dynamic activities to improve functional performance Minutes (14730) 12   Symptoms Noted During/After Treatment None   Treatment Detail/Skilled Intervention Supine to sit Mod I with HOB elevated and use of bed rail, increased time for set up. Sit to/from stand SBA, cueing for safe hand placement and LE positioning, standing for 3 mins for RN to hang medication. Pt impulsive throughout session with FWW usage, cues for safety and education on pt WB, pain control, safety. Pt standing but unable to void after amb.   Gait Training   Gait Training Minutes (30029) 5   Symptoms Noted During/After Treatment (Gait Training) none   Treatment Detail/Skilled Intervention Pt amb in the halls well with FWW and CGA, no LOB or adverse s/s, educated on walking program  and encouraged amb with nursing tonight again. Cues for navigation and safety.   Distance in Feet 150'   Gre[place] Level (Gait Training) contact guard   Physical Assistance Level (Gait Training) supervision;verbal cues   Weight Bearing (Gait Training) weight-bearing as tolerated   Assistive Device (Gait Training) rolling walker   Pattern Analysis (Gait Training) swing-through gait   Gait Analysis Deviations decreased hang;decreased step length   Impairments (Gait Analysis/Training) strength decreased   PT Discharge Planning   PT Plan Stairs, JAREK HEP   PT Discharge Recommendation (DC Rec) other (see comments)  (Defer to ortho)   PT Rationale for DC Rec Pt amb and transferring well, no pain yet, expect no mobility issues d/c tomorrow to home with assist from spouse as needed.   PT Brief overview of current status transfers and amb 150' with FWW SBA   PT Equipment Needed at Discharge cane, straight;walker, rolling   MUN[facility]  OUTPATIENT PHYSICAL THERAPY EVALUATION  PLAN OF TREATMENT FOR OUTPATIENT REHABILITATION  (COMPLETE FOR INITIAL CLAIMS ONLY)  Patient's Last Name, First Name, M.I.  YOB: 1945  Vik Veliz                        Provider's Name  [facility] Medical Record No.  [id number]                             Onset Date:  (P) 05/13/24   Start of Care Date:  (P) 05/13/24   Type:     _X_PT   ___OT   ___SLP Medical Diagnosis:  (P) s/p JAREK              PT Diagnosis:  (P) impaired functional mobility Visits from SOC:  1     See note for plan of treatment, functional goals and certification details    I CERTIFY THE NEED FOR THESE SERVICES FURNISHED UNDER        THIS PLAN OF TREATMENT AND WHILE UNDER MY CARE     (Physician co-signature of this document indicates review and certification of the therapy plan).

## 2024-05-13 NOTE — PROGRESS NOTES
Patient vital signs are at baseline: Yes  Patient able to ambulate as they were prior to admission or with assist devices provided by therapies during their stay:  No,  Reason:  Pt is working with PT now.  Just got to the floor at 1215.  Patient MUST void prior to discharge:  No,  Reason:  Pt was straight cathed in PACU around 1140.  Still due to void.  Patient able to tolerate oral intake:  Yes  Pain has adequate pain control using Oral analgesics:  Yes  Does patient have an identified :  Yes  Has goal D/C date and time been discussed with patient:  Yes

## 2024-05-13 NOTE — CONSULTS
[facility] MEDICINE CONSULT NOTE   Physician requesting consult: Balaji Odne MD    Reason for consult: Postoperative medical management of medical co-morbidities as below    Assessment and Plan    79 year old male into [facility] on 5/12/2024 after  Presenting for an elective left JAREK.  Pt had spinal  Anesthesia with an EBL of 200 mls.     [facility] service was asked to evaluate patient for postoperative medical management as follows below. Please resume the home medications as reconciled and further noted below with ordered hold parameters.  Vital signs have been stable post-operatively including hemodynamically stable blood pressure and heart rate. Thank you for this consult; we will continue to follow this patient until discharge.    Problem list:     POD #0 left TKA: per ortho  HTN: resume lisinopril tonight  Dyslipidemia: resume statin tonight  BPH:  resume flomax tonight  Dvt prevention:  scd, early ambulation and  1 month of baby aspirin twice daily        -Reviewed the patient's preoperative H and P and updated missing elements.  -Home medication reconciliation has been reviewed.  Medications have been ordered as noted from the home list and changes are documented above     Past Medical History     Patient Active Problem List    Diagnosis Date Noted    Osteoarthritis of left hip 05/13/2024     Priority: Medium        Surgical History   He  has no past surgical history on file.   History reviewed. No pertinent surgical history.    Family History    Reviewed, and family history is not on file.    Social History    Reviewed, and he  reports that he has never smoked. He has never used smokeless tobacco. He reports current alcohol use. He reports that he does not use drugs.  Social History     Tobacco Use    Smoking status: Never    Smokeless tobacco: Never   Substance Use Topics    Alcohol use: Yes     Comment: 14 etoh per week       Allergies   No Known Allergies    Prior to Admission  Medications      Medications Prior to Admission   Medication Sig Dispense Refill Last Dose    lisinopril (ZESTRIL) 10 MG tablet Take 10 mg by mouth at bedtime   5/12/2024 at PM    simvastatin (ZOCOR) 20 MG tablet Take 20 mg by mouth at bedtime   5/12/2024 at PM    tamsulosin (FLOMAX) 0.4 MG capsule Take 0.8 mg by mouth daily   5/12/2024 at AM       Review of Systems   A 12 point comprehensive review of systems was negative except as noted above.    OBJECTIVE         Physical Exam   Temp:  [94.6  F (34.8  C)-98.5  F (36.9  C)] 94.6  F (34.8  C)  Pulse:  [61-75] 62  Resp:  [14-18] 18  BP: (101-178)/(59-82) 114/66  SpO2:  [98 %-100 %] 98 %  There is no height or weight on file to calculate BMI.    GENERAL:  Awake, alert, resting, no distress   HEAD:  Normocephalic, without obvious abnormality, atraumatic   EYES:  PERRL, conjunctiva/corneas clear, no scleral icterus, EOM's intact   NOSE: Nares normal, septum midline, mucosa normal, no drainage   THROAT: Lips, mucosa, and tongue normal; teeth and gums normal, mouth moist   NECK: Supple, symmetrical, trachea midline   BACK:   Symmetric, no curvature, ROM normal   LUNGS:   Clear to auscultation bilaterally, no rales, rhonchi, or wheezing, symmetric chest rise on inhalation, respirations unlabored   CHEST WALL:  No tenderness or deformity   HEART:  Regular rate and rhythm, S1 and S2 normal, no murmur, rub, or gallop    ABDOMEN:   Soft, non-tender, bowel sounds active all four quadrants, no masses, no organomegaly, no rebound or guarding   EXTREMITIES: Post op dressings not pulled; bilateral pink warm feet   SKIN: Dry to touch, no exanthems in the visualized areas   NEURO: Alert, oriented x 4, moves all four extremities freely/spontaneously   PSYCH: Cooperative, behavior is appropriate           Imaging Reviewed Personally By Myself    Radiology Results:   Recent Results (from the past 24 hour(s))   POC US Guidance Needle Placement    Narrative    Ultrasound was performed as  "guidance to an anesthesia procedure.  Click   \"PACS images\" hyperlink below to view any stored images.  For specific   procedure details, view procedure note authored by anesthesia.   POC US Guidance Needle Placement    Narrative    Ultrasound was performed as guidance to an anesthesia procedure.  Click   \"PACS images\" hyperlink below to view any stored images.  For specific   procedure details, view procedure note authored by anesthesia.       Labs Reviewed Personally By Myself   Most Recent 3 BMP's:No lab results found.    Preoperative Labs Reviewed Personally By Myself     Thank you for this consultation.  Appreciate the opportunity to participate in the care of Vik Veliz, please feel free to contact us for any questions or concerns.    Yadira Alvarez MD  [facility]  Phone: #[phone]   "

## 2024-05-13 NOTE — ANESTHESIA CARE TRANSFER NOTE
Patient: Vik Veliz    Procedure: Procedure(s):  LEFT DIRECT ANTERIOR TOTAL HIP ARTHROPLASTY       Diagnosis: Osteoarthritis of left hip [M16.12]  Diagnosis Additional Information: No value filed.    Anesthesia Type:   Spinal     Note:    Oropharynx: oropharynx clear of all foreign objects and spontaneously breathing  Level of Consciousness: awake  Oxygen Supplementation: face mask  Level of Supplemental Oxygen (L/min / FiO2): 6  Independent Airway: airway patency satisfactory and stable  Dentition: dentition unchanged  Vital Signs Stable: post-procedure vital signs reviewed and stable  Report to RN Given: handoff report given  Patient transferred to: PACU    Handoff Report: Identifed the Patient, Identified the Reponsible Provider, Reviewed the pertinent medical history, Discussed the surgical course, Reviewed Intra-OP anesthesia mangement and issues during anesthesia, Set expectations for post-procedure period and Allowed opportunity for questions and acknowledgement of understanding      Vitals:  Vitals Value Taken Time   /59 05/13/24 0858   Temp 36.9  C (98.5  F) 05/13/24 0858   Pulse 65 05/13/24 0858   Resp 14 05/13/24 0858   SpO2 99 % 05/13/24 0858       Electronically Signed By: PAOLA Bustos CRNA  May 13, 2024  9:00 AM

## 2024-05-13 NOTE — OP NOTE
Operative Note    Name:  Vik Veliz  Location: [facility] Main OR  Procedure Date:  5/13/2024  PCP:  Dione Ennis      Procedure(s):  LEFT DIRECT ANTERIOR TOTAL HIP ARTHROPLASTY     Implant Name Type Inv. Item Serial No.  Lot No. LRB No. Used Action   IMP LINER HOWM ACETABULUM 36MM 54-56MM 0DEG 621-00-36F - [id number] Total Joint Component/Insert IMP LINER HOWM ACETABULUM 36MM 54-56MM 0DEG 621-00-36F  [employer] [id number] Left 1 Implanted   SHELL ACETABULAR 56F SOLIDBACK TRITANIUM - [id number] Total Joint Component/Insert SHELL ACETABULAR 56F SOLIDBACK TRITANIUM  [employer] [id number] Left 1 Implanted   IMPLANT HIP 41MM NK INSG 7 HPSTM HI OFST 109MM 9689-3639 - [id number] Total Joint Component/Insert IMPLANT HIP 41MM NK INSG 7 HPSTM HI OFST 109MM 5812-5091  [employer] [id number] Left 1 Implanted   IMP HEAD FEMORAL STRK BIOLOX DELTA CERAMIC 36MM +0MM - [id number] Total Joint Component/Insert IMP HEAD FEMORAL STRK BIOLOX DELTA CERAMIC 36MM +0MM  [employer] [id number] Left 1 Implanted       Pre-Procedure Diagnosis:  Osteoarthritis of left hip [M16.12]     Post-Procedure Diagnosis:    same    Surgeon(s):  Ana Baker PA-C Wickum, Daren J, MD    Assistants:  Required for patient positioning, intraoperative retraction, patient safety, and wound closure.    Anesthesia Type:  Spinal     Findings:  Arthritis    Operative Report:      After satisfactory infiltration of spinal block anesthetic, the patient was placed supine on the table. Both lower extremities were then prepped and draped in usual sterile fashion. A timeout was then conducted to ensure proper surgical site, after which a longitudinal incision was made just lateral and distal to the ASIS. The tensor fascia was then incised, and the muscle belly retracted lateral. The circumflex vessels were cauterized and divided. Blunt retractors then placed over the superior and inferior capsule, and an anterior  capsulectomy performed. The femoral neck cut was then made per preoperative templating. The disc of bone was removed from the femoral neck. The femoral head was removed with a corkscrew reamer. The femur was retracted posteriorly exposing the acetabulum. All soft tissues were removed from the acetabulum, and the acetabulum sequentially reamed to accommodate the shell. We reamed line to line. Impaction of the shell showed it had excellent scratch fit. The dome hole cover was placed. At this point an intraoperative standardized cocktail of was infiltrated around the periarticular soft tissues. The polythene liner was then snapped into place. The proximal femur was then delivered into the wound through a series of proximal femoral capsular releases, hip hyperextension, adduction, and external rotation. The femur was then sequentially broached to accommodate a size 7 stem, high offset is what we templated. Trial showed a +0  head to be the most appropriate for leg lengths.  There was  excellent stability. Trials were then removed, the wound copiously irrigated, and the stem impacted followed by the head. The hip was reduced, checked for interposition and impingement. The wound thoroughly irrigated once more. Bleeders cauterized. The wound was then closed in layers: 2-0 Vicryl for the tensor fascia, 2-0 Vicryl for the subcutaneous tendinous tissues, 3-0 Monocryl for the skin, followed by Dermabond. Sterile dressings were applied.  The patient was then awakened and taken to the [facility] stable.  The patient tolerated the procedure well. There were no apparent intraoperative complications. The plan is to have the patient weight-bear as tolerated, no postoperative hip precautions or restrictions. We'll place the patient on a multimodal DVT prophylaxis protocol including early ambulation, mechanical plexus, a chemical prophylaxis ×1 month      Estimated Blood Loss:   200cc         Drains:        Complications:    None    Balaji ERNST  MD Akshat     Date: 5/13/2024  Time: 8:35 AM

## 2024-05-14 ENCOUNTER — APPOINTMENT (OUTPATIENT)
Dept: PHYSICAL THERAPY | Facility: CLINIC | Age: 79
End: 2024-05-14
Payer: COMMERCIAL

## 2024-05-14 ENCOUNTER — APPOINTMENT (OUTPATIENT)
Dept: OCCUPATIONAL THERAPY | Facility: CLINIC | Age: 79
End: 2024-05-14
Payer: COMMERCIAL

## 2024-05-14 VITALS
TEMPERATURE: 97.9 F | HEART RATE: 96 BPM | RESPIRATION RATE: 18 BRPM | OXYGEN SATURATION: 96 % | SYSTOLIC BLOOD PRESSURE: 157 MMHG | DIASTOLIC BLOOD PRESSURE: 81 MMHG

## 2024-05-14 LAB
FASTING STATUS PATIENT QL REPORTED: ABNORMAL
GLUCOSE SERPL-MCNC: 113 MG/DL (ref 70–99)
HGB BLD-MCNC: 12 G/DL (ref 13.3–17.7)

## 2024-05-14 PROCEDURE — 82947 ASSAY GLUCOSE BLOOD QUANT: CPT | Performed by: ORTHOPAEDIC SURGERY

## 2024-05-14 PROCEDURE — 97535 SELF CARE MNGMENT TRAINING: CPT | Mod: GO

## 2024-05-14 PROCEDURE — 97166 OT EVAL MOD COMPLEX 45 MIN: CPT | Mod: GO

## 2024-05-14 PROCEDURE — 97116 GAIT TRAINING THERAPY: CPT | Mod: GP

## 2024-05-14 PROCEDURE — 97110 THERAPEUTIC EXERCISES: CPT | Mod: GP

## 2024-05-14 PROCEDURE — 250N000013 HC RX MED GY IP 250 OP 250 PS 637

## 2024-05-14 PROCEDURE — 36415 COLL VENOUS BLD VENIPUNCTURE: CPT

## 2024-05-14 PROCEDURE — 85018 HEMOGLOBIN: CPT

## 2024-05-14 RX ADMIN — SENNOSIDES AND DOCUSATE SODIUM 1 TABLET: 8.6; 5 TABLET ORAL at 08:38

## 2024-05-14 RX ADMIN — OXYCODONE HYDROCHLORIDE 5 MG: 5 TABLET ORAL at 08:39

## 2024-05-14 RX ADMIN — ACETAMINOPHEN 975 MG: 325 TABLET ORAL at 08:38

## 2024-05-14 RX ADMIN — ASPIRIN 81 MG: 81 TABLET, COATED ORAL at 08:38

## 2024-05-14 RX ADMIN — POLYETHYLENE GLYCOL 3350 17 G: 17 POWDER, FOR SOLUTION ORAL at 08:40

## 2024-05-14 ASSESSMENT — ACTIVITIES OF DAILY LIVING (ADL)
ADLS_ACUITY_SCORE: 28
IADL_COMMENTS: SPOUSE ABLE TO ASSIST WITH IADLS
ADLS_ACUITY_SCORE: 28

## 2024-05-14 NOTE — CONSULTS
Care Management Discharge Note    Discharge Date: 05/14/2024       Discharge Disposition: Home    Discharge Services: None    Discharge DME: None    Discharge Transportation: family or friend will provide    Private pay costs discussed: Not applicable    Does the patient's insurance plan have a 3 day qualifying hospital stay waiver?  No    PAS Confirmation Code: N/A  Patient/family educated on Medicare website which has current facility and service quality ratings: no    Education Provided on the Discharge Plan: No  Persons Notified of Discharge Plans: Pt  Patient/Family in Agreement with the Plan: yes    Handoff Referral Completed: No    Additional Information:  Chart reviewed.  No CM needs identified.  Family to transport.     FABRICE Lomax

## 2024-05-14 NOTE — PROGRESS NOTES
Occupational Therapy Discharge Summary    Reason for therapy discharge:    All goals and outcomes met, no further needs identified.    Progress towards therapy goal(s). See goals on Care Plan in [facility] electronic health record for goal details.  Goals met    Therapy recommendation(s):    No further therapy is recommended.

## 2024-05-14 NOTE — PROGRESS NOTES
05/14/24 0730   Appointment Info   Signing Clinician's Name / Credentials (OT) Hyacinth Morales OTR/L   Quick Adds   Quick Adds Certification   Living Environment   People in Home spouse   Current Living Arrangements house;other (see comments)  (split entry.  Will stay on the upper level.)   Living Environment Comments WIS with GB and shower chair.  RTS with Vanity on the R   Self-Care   Usual Activity Tolerance good   Current Activity Tolerance good   Instrumental Activities of Daily Living (IADL)   IADL Comments Spouse able to assist with IADLs   General Information   Onset of Illness/Injury or Date of Surgery 05/13/24   Referring Physician Dr. Balaji Oden   Patient/Family Therapy Goal Statement (OT) To return home with family   Additional Occupational Profile Info/Pertinent History of Current Problem Adm for L JAREK.  PMH:HTN, Dyslip, PAC, Hyperlip.   Existing Precautions/Restrictions no known precautions/restrictions  (Direct Anterior)   Left Lower Extremity (Weight-bearing Status) weight-bearing as tolerated (WBAT)   Cognitive Status Examination   Follows Commands WNL   Visual Perception   Visual Impairment/Limitations corrective lenses full-time   Bed Mobility   Bed Mobility rolling right;supine-sit;sit-supine   Rolling Right [place] (Bed Mobility) supervision;verbal cues   Supine-Sit [place] (Bed Mobility) supervision;verbal cues   Sit-Supine [place] (Bed Mobility) supervision;verbal cues   Transfers   Transfers bed-chair transfer;sit-stand transfer;toilet transfer   Transfer Skill: Bed to Chair/Chair to Bed   Bed-Chair [place] (Transfers) supervision;verbal cues   Assistive Device (Bed-Chair Transfers) rolling walker   Sit-Stand Transfer   Sit-Stand [place] (Transfers) supervision;verbal cues   Assistive Device (Sit-Stand Transfers) walker, front-wheeled   Toilet Transfer   Type (Toilet Transfer) sit-stand;stand-sit   [place] Level (Toilet Transfer) supervision;verbal cues    Assistive Device (Toilet Transfer) raised toilet seat;grab bars/safety frame   Balance   Balance Assessment standing dynamic balance   Standing Balance: Dynamic supervision;verbal cues   Position/Device Used, Standing Balance walker, front-wheeled   Activities of Daily Living   BADL Assessment/Intervention upper body dressing;lower body dressing   Upper Body Dressing Assessment/Training   Position (Upper Body Dressing) supported sitting   [place] Level (Upper Body Dressing) upper body dressing skills;don;pull-over garment;independent   Lower Body Dressing Assessment/Training   Position (Lower Body Dressing) supported sitting;supported standing   [place] Level (Lower Body Dressing) lower body dressing skills;don;pants/bottoms;shoes/slippers;socks;supervision;verbal cues   Clinical Impression   Criteria for Skilled Therapeutic Interventions Met (OT) Yes, treatment indicated   OT Diagnosis Decreased indep with ADLs and trsfs due to L JAREK   OT Problem List-Impairments impacting ADL problems related to;mobility   Assessment of Occupational Performance 3-5 Performance Deficits   Identified Performance Deficits Dressing, toileting, bathing, G/H and trsfs   Planned Therapy Interventions (OT) ADL retraining   Clinical Decision Making Complexity (OT) detailed assessment/moderate complexity   Risk & Benefits of therapy have been explained evaluation/treatment results reviewed;participants included;patient   OT Total Evaluation Time   OT Eval, Moderate Complexity Minutes (42193) 15   Therapy Certification   Medical Diagnosis L JAERK   OT Goals   Therapy Frequency (OT) One time eval and treatment   OT Goals Lower Body Dressing;Transfers   OT: Lower Body Dressing Modified independent;using adaptive equipment;within precautions;Completed   OT: Transfer Modified independent;with assistive device;within precautions;Completed   Interventions   Interventions Quick Adds Self-Care/Home Management   Self-Care/Home Management    Self-Care/Home Mgmt/ADL, Compensatory, Meal Prep Minutes (20382) 30   Symptoms Noted During/After Treatment (Meal Preparation/Planning Training) none   Treatment Detail/Skilled Intervention Pt sitting in chair when therapist arrived. Reviewed with Pt the JAREK precautions - No precautions. Pt  verbalized understanding. Worked on FB dressing. Pt was indep with U/B dressing while sitting. Worked on L/B dressing with SBA for VC for sequencing and use of AE - reacher. By end of session, Pt was mod indep with L/B dressing. Worked on room trsfs using the FWW to the bed, chair, toilet, WIS, BR sink. Initially needing SBA for VC for correct hand placement. By end of session, Pt was mod indep using FWW. Pt was shown and practiced bed mobility using pillows between legs when resting on thier R side. Therapist demo the car trsf and kitchen mobility. Pt and spouse understood. At end of session, Pt was sitting in the chair in bed with alarm on and call light within reach. RN aware.   OT Discharge Planning   OT Plan D/C OT   OT Discharge Recommendation (DC Rec) other (see comments)   OT Rationale for DC Rec Pt will have assist from family at home.   OT Brief overview of current status Pt has met his OT goals.  Mod indep with trsfs and ADLs   Total Session Time   Timed Code Treatment Minutes 30   Total Session Time (sum of timed and untimed services) 45    [facility]  OUTPATIENT OCCUPATIONAL THERAPY  EVALUATION  PLAN OF TREATMENT FOR OUTPATIENT REHABILITATION  (COMPLETE FOR INITIAL CLAIMS ONLY)  Patient's Last Name, First Name, M.I.  YOB: 1945  Vik Veliz                          Provider's Name  [facility] Medical Record No.  [id number]                             Onset Date:  05/13/24   Start of Care Date:      Type:     ___PT   _X_OT   ___SLP Medical Diagnosis:  L JAREK                    OT Diagnosis:  Decreased indep with ADLs and trsfs due to L  JAREK Visits from SOC:  1     See note for plan of treatment, functional goals and certification details    I CERTIFY THE NEED FOR THESE SERVICES FURNISHED UNDER        THIS PLAN OF TREATMENT AND WHILE UNDER MY CARE     (Physician co-signature of this document indicates review and certification of the therapy plan).

## 2024-05-14 NOTE — PLAN OF CARE
Goal Outcome Evaluation:    Patient discharged home with wife and AVS. Notified of medications sent to the pharmacy. Will take as directed and follow up in 2-3 weeks for post operative follow up apt.

## 2024-05-14 NOTE — PROGRESS NOTES
"Orthopedic Progress Note      Assessment: 1 Day Post-Op  S/P Procedure(s):  LEFT DIRECT ANTERIOR TOTAL HIP ARTHROPLASTY @    Plan:   - Continue PT/OT.   - Weightbearing status: WBAT  - Anticoagulation: Aspirin 81 mg po bid x30 days, in addition to SCDs, rina stockings and early ambulation.  - Duricef 500 mg po bid x7 days.  - Discharge planning: Discharge to home today     Subjective:  Pain: Well controlled on Oxycodone  Nausea, Vomiting:  No  Chest pain: No  Lightheadedness, Dizziness:  No  Neuro:  Patient denies new onset numbness or paresthesias     Patient doing well on POD #1. Ambulating, tolerating oral intake, voiding & pain is controlled with oral medications. Hgb 12.0.     Objective:  BP (!) 157/81 (BP Location: Left arm, Patient Position: Chair, Cuff Size: Adult Regular)   Pulse 96   Temp 97.9  F (36.6  C) (Oral)   Resp 18   SpO2 96%   The patient is A&Ox3. Appears comfortable, sitting up at bedside.  Calves are soft and non-tender bilaterally  Brisk capillary refill in the toes.   Palpable left dorsalis pedis pulse. Foot warm & well-perfused.  Sensation is intact to light touch & equal bilaterally in the femoral, DP, SP & tibial nerve distributions.  ROM: Flexes at left hip. Appropriately flexes & extends all toes bilaterally.   Motor: +5/5 dorsiflexion, plantar flexion & EHL bilaterally. Fires quad.   Leg lengths equal.  left hip dressing C/D/I scant strikethrough proximal aspect of dressing , no surrounding erythema.      Pertinent Labs   Lab Results: personally reviewed.   No results found for: \"INR\", \"PROTIME\"  Lab Results   Component Value Date    HGB 12.0 (L) 05/14/2024     No results found for: \"NA\", \"CO2\"      Report completed by:  Rohini Greer PA-C/Dr. [facility]    Date: 5/14/2024  Time: 9:07 AM   "

## 2024-05-14 NOTE — PLAN OF CARE
Patient vital signs are at baseline: Yes  Patient able to ambulate as they were prior to admission or with assist devices provided by therapies during their stay:  Yes  Patient MUST void prior to discharge:  Yes  Patient able to tolerate oral intake:  Yes  Pain has adequate pain control using Oral analgesics:  Yes  Does patient have an identified :  Yes  Has goal D/C date and time been discussed with patient:  Yes    Urine is an yony color, pt had gotten strait cathed in PACU.       Yazmin Quiros RN on 5/13/2024 at 10:02 PM

## 2025-02-20 ENCOUNTER — TRANSFERRED RECORDS (OUTPATIENT)
Dept: HEALTH INFORMATION MANAGEMENT | Facility: CLINIC | Age: 80
End: 2025-02-20
Payer: COMMERCIAL

## 2025-02-27 NOTE — PROGRESS NOTES
02/27/25 1411   Discharge Planning   Patient/Family Anticipates Transition to home with family   Living Arrangements   People in Home spouse   Type of Residence Private Residence   Is your private residence a single family home or apartment? Single family home   Support System   Do you have someone available to stay with you one or two nights once you are home? Yes   Medical Clearance   Clinic Name [facility]   Blood   Known Bleeding Disorder or Coagulopathy No   Falls Risk   Has the patient been identified as a high falls risk before surgery? (fallen in the last 6 months, history of falls, unsteady gait, or balance issues) Yes   Did an order get placed to attend outpatient pre-surgery balance evaluation? Yes   Relationship/Environment   Name(s) of People in Home Wife Alyssa

## 2025-03-11 NOTE — H&P (VIEW-ONLY)
Pre-Operative Assessment        3/11/2025   Pre-Op Assessment Procedure Details   Procedure RIGHT DIRECT ANTERIOR TOTAL HIP ARTHROPLASTY   Surgeon Dr. Oden   Location Other   Other Location Name [facility]   Procedure Date 3/19/2025   Fax Number [phone]     Cailin Chery is a 79 y.o. old male here for pre-operative evaluation for procedure noted above.     Patient Active Problem List    Diagnosis Date Noted     CAREPLAN: ADVANCE DIRECTIVES/CODE STATUS 07/13/2016     Paroxysmal tachycardia, unspecified (HRC) 01/24/2025     Benign prostatic hyperplasia with urinary frequency 12/04/2023     History of squamous cell carcinoma 02/23/2021     Overview Note:     SCCis, left anterior thigh, s/p shave 9/2020       History of basal cell carcinoma 09/24/2019     Overview Note:     BCC, left forehead s/p Mohs 10/2005       Abnormal ECG      PSVT (paroxysmal supraventricular tachycardia) (HRC)      Cortical age-related cataract of both eyes 04/24/2018     Nuclear sclerotic cataract of both eyes 04/24/2018     Essential hypertension (HRC) 07/07/2016     Overview Note:     With diet and exercise.  Significant improvement.  Still little bit high in clinic but better at home.  Given age and lack of heart history, monitor.  Do not st       Hypercholesterolemia 07/07/2016     Overview Note:     Never took Pravachol as directed 2015.  Recheck with recent lifestyle changes.  No history of heart disease, diabetes or other vascular disease.       Glaucoma suspect of both eyes 04/01/2016     Myopia OU 04/01/2016      Past Medical History:   Diagnosis Date     Abnormal ECG      BP (high blood pressure) (HRC)      Hearing loss      HTN (hypertension) (HRC)      PSVT (paroxysmal supraventricular tachycardia) (HRC)     Holter 2018 - 6% PAC burden.   11 beat run SVT longest (PAT)      Past Surgical History:   Procedure Laterality Date     HERNIA REPAIR  1947     SURGICAL HX - NEG        Current Outpatient Medications   Medication  Instructions     ipratropium (ATROVENT) 0.03 % nasal solution 2 Sprays, Both Nostrils, DAILY     lisinopril (ZESTRIL) 10 mg, Oral, DAILY     simvastatin (ZOCOR) 20 mg, Oral, HS     tamsulosin (FLOMAX) 0.8 mg, Oral, DAILY     No Known Allergies    Social History     Occupational History     Occupation: Retired     Comment:    Tobacco Use     Smoking status: Never     Smokeless tobacco: Never   Vaping Use     Vaping status: Never Used   Substance and Sexual Activity     Alcohol use: Yes     Alcohol/week: 15.0 - 20.0 standard drinks of alcohol     Types: 15 - 20 Glasses of wine per week     Comment: 14 glasses per week     Drug use: Never     Sexual activity: Not Currently     Partners: Female     Birth control/protection: None     Comment: No birth control because I have no sex.       No LMP for male patient.    Family History   Problem Relation Name Age of Onset     Cancer, Lung Birth Mother Maria L Veliz      Cancer Birth Mother Maria L Veliz         She was a smoker     Stroke Brother Chele Veliz      Macular Degeneration Negative Family History       Glaucoma Negative Family History       Cataract Negative Family History       Review of Systems:      3/11/2025   ET Amb PreOp Assessment Sx   Have you had a heart attack in the last 30 days? No   Have you experienced chest tightening or chest pressure with activity? No   Do you wake at night with difficulty breathing? No   Do you have swelling in your feet or ankles? No   Do you get short of breath if lying flat at night? No   Do you hear wheezing or whistling when you breathe? No   Have you had a cough, runny nose, or cold symptoms in the last 2 weeks? No   Have you tested positive for Covid in the last 6 months? No   Do you have a long-standing cough? No   Do you snore or are you sleepy during the day? Yes   Do you have any symptoms due to a recent concussion? No   Do you or close relatives have bleeding or clotting problems? No   Have you taken Aspirin,  "Ibuprofen (Advil) or Naproxen (Aleve) in the last 7 days? Yes   Do you or close relatives have a history of a severe or life-threatening reaction to anesthesia? No   Explanation of positive responses: ibuprofen PRN     Estimated Functional Capacity  Can you climb one flight of stairs, or walk up a gradual uphill without stopping? yes, functional capacity is more than or equal to 4 METS    Objective   BP (!) 153/79 (BP Location: Right Arm, BP Cuff Size: Regular)   Pulse 65   Ht 5' 9.37\" (1.762 m)   Wt 170 lb (77.1 kg)   BMI 24.84 kg/m      Physical Exam:  General Appearance: alert, well appearing, and in no apparent distress  Eyes:  lids normal, sclera clear, and conjunctiva normal  ENT:  oropharynx clear, ear canals clear, TMs normal, no sinus tenderness to percussion, and no nasal drainage  Neck:  no lymphadenopathy, no thyromegaly or nodules, and supple  Heart:  regular rate and rhythm, no murmurs, gallops or rubs, and normal S1 and S2  Lungs:  clear to auscultation, no wheezes, rales or rhonchi, equal breath sounds throughout, and normal respiratory effort  Abdomen:  soft, nondistended, nontender, and normal bowel sounds  Extremities:  no edema and normal pedal pulses  Neurologic:  normal speech, no facial droop, alert and oriented x 3, and normal gait    Data:    Labs: Yes:   Sodium   Date Value Ref Range Status   03/11/2025 143 136 - 145 mmol/L Final     Potassium   Date Value Ref Range Status   03/11/2025 4.3 3.5 - 5.1 mmol/L Final     Creatinine   Date Value Ref Range Status   03/11/2025 0.99 0.73 - 1.18 mg/dL Final     Hemoglobin   Date Value Ref Range Status   03/11/2025 14.1 13.5 - 17.5 g/dL Final     Hemoglobin A1C   Date Value Ref Range Status   01/24/2025 5.3 <=5.6 % Final     Glucose   Date Value Ref Range Status   04/23/2024 107 (H) 70 - 100 mg/dL Final     Comment:     The given reference range is for the fasting state. Non-fasting reference range for glucose is 70 - 180 mg/dL.     ECG: Today: " 3/11/2025. SR, premature atrial complexes are no longer present when compared to ECG on 4/4/24.    Assessment/Plan   Patient is medically optimized for planned procedure(s).      ICD-10-CM    1. Preop examination  Z01.818 Sodium     Hemoglobin, Blood     Ecg 12-Lead Routine - MUSE     Ecg 12-Lead Routine (Lab perform)     Potassium     Creatinine / GFR      2. Osteoarthritis of right hip, unspecified osteoarthritis type  M16.11         Blood pressure elevated today, this is not unusual for him. Asked him to send me his home blood pressure readings via the online portal.    Special risks:  Patient's cardiovascular condition is medically managed and stable.  Stress testing not indicated.    Medication recommendations:  Patient Instructions   lisinopril (ZESTRIL) 10 MG tablet [[id number]]  -  Do not take on the morning of procedure.     Follow your individualized medication recommendations as described above.    In addition, please stop all over-the-counter medications including aspirin, ibuprofen (Advil, Motrin), naproxen (Aleve, Naprosyn), herbal remedies and supplements one week prior to procedure unless directed otherwise by your care team. You may continue to take acetaminophen (Tylenol) up to the day of your procedure.    Continue all other medications as currently taking. Let your care team know if you have questions.    On the day of your procedure, do not wear any hair product including hair sprays and gels and avoid using body sprays and deodorants/antiperspirants.    Bring with you to the site of the procedure:    Any oral appliances or CPAP equipment related to sleep apnea  Any other health-related equipment or devices you use daily      Electronically signed by: PAOLA Moy, CNP  3/11/2025, 12:03 PM

## 2025-03-18 ENCOUNTER — ANESTHESIA EVENT (OUTPATIENT)
Dept: SURGERY | Facility: CLINIC | Age: 80
End: 2025-03-18
Payer: COMMERCIAL

## 2025-03-19 ENCOUNTER — ANESTHESIA (OUTPATIENT)
Dept: SURGERY | Facility: CLINIC | Age: 80
End: 2025-03-19
Payer: COMMERCIAL

## 2025-03-19 ENCOUNTER — HOSPITAL ENCOUNTER (OUTPATIENT)
Facility: CLINIC | Age: 80
Discharge: HOME | End: 2025-03-19
Attending: ORTHOPAEDIC SURGERY | Admitting: ORTHOPAEDIC SURGERY
Payer: COMMERCIAL

## 2025-03-19 VITALS
BODY MASS INDEX: 25.33 KG/M2 | HEART RATE: 69 BPM | OXYGEN SATURATION: 97 % | TEMPERATURE: 97.2 F | WEIGHT: 171 LBS | HEIGHT: 69 IN | SYSTOLIC BLOOD PRESSURE: 117 MMHG | RESPIRATION RATE: 18 BRPM | DIASTOLIC BLOOD PRESSURE: 84 MMHG

## 2025-03-19 DIAGNOSIS — Z96.641 AFTERCARE FOLLOWING RIGHT HIP JOINT REPLACEMENT SURGERY: Primary | ICD-10-CM

## 2025-03-19 DIAGNOSIS — Z47.1 AFTERCARE FOLLOWING RIGHT HIP JOINT REPLACEMENT SURGERY: Primary | ICD-10-CM

## 2025-03-19 PROBLEM — M16.11 OSTEOARTHRITIS OF RIGHT HIP: Status: ACTIVE | Noted: 2025-03-19

## 2025-03-19 PROCEDURE — 258N000003 HC RX IP 258 OP 636: Performed by: NURSE ANESTHETIST, CERTIFIED REGISTERED

## 2025-03-19 PROCEDURE — 250N000011 HC RX IP 250 OP 636: Performed by: ORTHOPAEDIC SURGERY

## 2025-03-19 PROCEDURE — 250N000011 HC RX IP 250 OP 636

## 2025-03-19 PROCEDURE — 250N000009 HC RX 250: Performed by: ORTHOPAEDIC SURGERY

## 2025-03-19 PROCEDURE — 97110 THERAPEUTIC EXERCISES: CPT | Mod: GP

## 2025-03-19 PROCEDURE — 250N000009 HC RX 250

## 2025-03-19 PROCEDURE — 360N000077 HC SURGERY LEVEL 4, PER MIN: Performed by: ORTHOPAEDIC SURGERY

## 2025-03-19 PROCEDURE — 272N000001 HC OR GENERAL SUPPLY STERILE: Performed by: ORTHOPAEDIC SURGERY

## 2025-03-19 PROCEDURE — 97116 GAIT TRAINING THERAPY: CPT | Mod: GP

## 2025-03-19 PROCEDURE — 250N000013 HC RX MED GY IP 250 OP 250 PS 637

## 2025-03-19 PROCEDURE — 250N000011 HC RX IP 250 OP 636: Performed by: NURSE ANESTHETIST, CERTIFIED REGISTERED

## 2025-03-19 PROCEDURE — 710N000010 HC RECOVERY PHASE 1, LEVEL 2, PER MIN: Performed by: ORTHOPAEDIC SURGERY

## 2025-03-19 PROCEDURE — 99204 OFFICE O/P NEW MOD 45 MIN: CPT | Performed by: INTERNAL MEDICINE

## 2025-03-19 PROCEDURE — 250N000011 HC RX IP 250 OP 636: Performed by: ANESTHESIOLOGY

## 2025-03-19 PROCEDURE — 710N000012 HC RECOVERY PHASE 2, PER MINUTE: Performed by: ORTHOPAEDIC SURGERY

## 2025-03-19 PROCEDURE — 370N000017 HC ANESTHESIA TECHNICAL FEE, PER MIN: Performed by: ORTHOPAEDIC SURGERY

## 2025-03-19 PROCEDURE — 250N000009 HC RX 250: Performed by: STUDENT IN AN ORGANIZED HEALTH CARE EDUCATION/TRAINING PROGRAM

## 2025-03-19 PROCEDURE — 999N000141 HC STATISTIC PRE-PROCEDURE NURSING ASSESSMENT: Performed by: ORTHOPAEDIC SURGERY

## 2025-03-19 PROCEDURE — 97161 PT EVAL LOW COMPLEX 20 MIN: CPT | Mod: GP

## 2025-03-19 PROCEDURE — C1776 JOINT DEVICE (IMPLANTABLE): HCPCS | Performed by: ORTHOPAEDIC SURGERY

## 2025-03-19 PROCEDURE — 258N000003 HC RX IP 258 OP 636: Performed by: STUDENT IN AN ORGANIZED HEALTH CARE EDUCATION/TRAINING PROGRAM

## 2025-03-19 DEVICE — CERAMIC V40 FEMORAL HEAD
Type: IMPLANTABLE DEVICE | Site: HIP | Status: FUNCTIONAL
Brand: BIOLOX

## 2025-03-19 DEVICE — TRIDENT II TRITANIUM SOLIDBACK ACETABULAR SHELL 58F
Type: IMPLANTABLE DEVICE | Site: HIP | Status: FUNCTIONAL
Brand: TRIDENT II

## 2025-03-19 DEVICE — 0 DEGREE POLYETHYLENE INSERT
Type: IMPLANTABLE DEVICE | Site: HIP | Status: FUNCTIONAL
Brand: TRIDENT

## 2025-03-19 DEVICE — HIP STEM - HIGH OFFSET
Type: IMPLANTABLE DEVICE | Site: HIP | Status: FUNCTIONAL
Brand: INSIGNIA

## 2025-03-19 RX ORDER — NALOXONE HYDROCHLORIDE 0.4 MG/ML
0.1 INJECTION, SOLUTION INTRAMUSCULAR; INTRAVENOUS; SUBCUTANEOUS
Status: DISCONTINUED | OUTPATIENT
Start: 2025-03-19 | End: 2025-03-19 | Stop reason: HOSPADM

## 2025-03-19 RX ORDER — SIMVASTATIN 20 MG
20 TABLET ORAL AT BEDTIME
Status: DISCONTINUED | OUTPATIENT
Start: 2025-03-19 | End: 2025-03-19 | Stop reason: HOSPADM

## 2025-03-19 RX ORDER — FENTANYL CITRATE 0.05 MG/ML
INJECTION, SOLUTION INTRAMUSCULAR; INTRAVENOUS PRN
Status: DISCONTINUED | OUTPATIENT
Start: 2025-03-19 | End: 2025-03-19

## 2025-03-19 RX ORDER — PROPOFOL 10 MG/ML
INJECTION, EMULSION INTRAVENOUS CONTINUOUS PRN
Status: DISCONTINUED | OUTPATIENT
Start: 2025-03-19 | End: 2025-03-19

## 2025-03-19 RX ORDER — DEXAMETHASONE SODIUM PHOSPHATE 4 MG/ML
4 INJECTION, SOLUTION INTRA-ARTICULAR; INTRALESIONAL; INTRAMUSCULAR; INTRAVENOUS; SOFT TISSUE
Status: DISCONTINUED | OUTPATIENT
Start: 2025-03-19 | End: 2025-03-19 | Stop reason: HOSPADM

## 2025-03-19 RX ORDER — HYDROMORPHONE HCL IN WATER/PF 6 MG/30 ML
0.2 PATIENT CONTROLLED ANALGESIA SYRINGE INTRAVENOUS EVERY 5 MIN PRN
Status: DISCONTINUED | OUTPATIENT
Start: 2025-03-19 | End: 2025-03-19 | Stop reason: HOSPADM

## 2025-03-19 RX ORDER — ONDANSETRON 4 MG/1
4 TABLET, ORALLY DISINTEGRATING ORAL EVERY 30 MIN PRN
Status: DISCONTINUED | OUTPATIENT
Start: 2025-03-19 | End: 2025-03-19 | Stop reason: HOSPADM

## 2025-03-19 RX ORDER — CEFAZOLIN SODIUM/WATER 2 G/20 ML
2 SYRINGE (ML) INTRAVENOUS
Status: COMPLETED | OUTPATIENT
Start: 2025-03-19 | End: 2025-03-19

## 2025-03-19 RX ORDER — CEFAZOLIN SODIUM/WATER 2 G/20 ML
2 SYRINGE (ML) INTRAVENOUS SEE ADMIN INSTRUCTIONS
Status: DISCONTINUED | OUTPATIENT
Start: 2025-03-19 | End: 2025-03-19 | Stop reason: HOSPADM

## 2025-03-19 RX ORDER — ACETAMINOPHEN 325 MG/1
975 TABLET ORAL ONCE
Status: COMPLETED | OUTPATIENT
Start: 2025-03-19 | End: 2025-03-19

## 2025-03-19 RX ORDER — ONDANSETRON 4 MG/1
4 TABLET, ORALLY DISINTEGRATING ORAL EVERY 6 HOURS PRN
Status: DISCONTINUED | OUTPATIENT
Start: 2025-03-19 | End: 2025-03-19 | Stop reason: HOSPADM

## 2025-03-19 RX ORDER — FENTANYL CITRATE 50 UG/ML
50 INJECTION, SOLUTION INTRAMUSCULAR; INTRAVENOUS EVERY 5 MIN PRN
Status: DISCONTINUED | OUTPATIENT
Start: 2025-03-19 | End: 2025-03-19 | Stop reason: HOSPADM

## 2025-03-19 RX ORDER — DEXAMETHASONE SODIUM PHOSPHATE 10 MG/ML
INJECTION, SOLUTION INTRAMUSCULAR; INTRAVENOUS PRN
Status: DISCONTINUED | OUTPATIENT
Start: 2025-03-19 | End: 2025-03-19

## 2025-03-19 RX ORDER — SODIUM CHLORIDE, SODIUM LACTATE, POTASSIUM CHLORIDE, CALCIUM CHLORIDE 600; 310; 30; 20 MG/100ML; MG/100ML; MG/100ML; MG/100ML
INJECTION, SOLUTION INTRAVENOUS CONTINUOUS
Status: DISCONTINUED | OUTPATIENT
Start: 2025-03-19 | End: 2025-03-19 | Stop reason: HOSPADM

## 2025-03-19 RX ORDER — HYDROMORPHONE HCL IN WATER/PF 6 MG/30 ML
0.4 PATIENT CONTROLLED ANALGESIA SYRINGE INTRAVENOUS EVERY 5 MIN PRN
Status: DISCONTINUED | OUTPATIENT
Start: 2025-03-19 | End: 2025-03-19 | Stop reason: HOSPADM

## 2025-03-19 RX ORDER — ASPIRIN 81 MG/1
81 TABLET ORAL 2 TIMES DAILY
Qty: 60 TABLET | Refills: 0 | Status: SHIPPED | OUTPATIENT
Start: 2025-03-19

## 2025-03-19 RX ORDER — TAMSULOSIN HYDROCHLORIDE 0.4 MG/1
0.8 CAPSULE ORAL DAILY
Status: DISCONTINUED | OUTPATIENT
Start: 2025-03-19 | End: 2025-03-19 | Stop reason: HOSPADM

## 2025-03-19 RX ORDER — HYDROXYZINE HYDROCHLORIDE 10 MG/1
10 TABLET, FILM COATED ORAL EVERY 6 HOURS PRN
Qty: 30 TABLET | Refills: 0 | Status: SHIPPED | OUTPATIENT
Start: 2025-03-19

## 2025-03-19 RX ORDER — LISINOPRIL 10 MG/1
10 TABLET ORAL AT BEDTIME
Status: DISCONTINUED | OUTPATIENT
Start: 2025-03-19 | End: 2025-03-19 | Stop reason: HOSPADM

## 2025-03-19 RX ORDER — ONDANSETRON 2 MG/ML
4 INJECTION INTRAMUSCULAR; INTRAVENOUS EVERY 6 HOURS PRN
Status: DISCONTINUED | OUTPATIENT
Start: 2025-03-19 | End: 2025-03-19 | Stop reason: HOSPADM

## 2025-03-19 RX ORDER — HYDROMORPHONE HCL IN WATER/PF 6 MG/30 ML
0.2 PATIENT CONTROLLED ANALGESIA SYRINGE INTRAVENOUS EVERY 4 HOURS PRN
Status: DISCONTINUED | OUTPATIENT
Start: 2025-03-19 | End: 2025-03-19 | Stop reason: HOSPADM

## 2025-03-19 RX ORDER — TRANEXAMIC ACID 650 MG/1
1950 TABLET ORAL ONCE
Status: COMPLETED | OUTPATIENT
Start: 2025-03-19 | End: 2025-03-19

## 2025-03-19 RX ORDER — ACETAMINOPHEN 325 MG/1
975 TABLET ORAL EVERY 8 HOURS
Status: DISCONTINUED | OUTPATIENT
Start: 2025-03-19 | End: 2025-03-19 | Stop reason: HOSPADM

## 2025-03-19 RX ORDER — BISACODYL 10 MG
10 SUPPOSITORY, RECTAL RECTAL DAILY PRN
Status: DISCONTINUED | OUTPATIENT
Start: 2025-03-22 | End: 2025-03-19 | Stop reason: HOSPADM

## 2025-03-19 RX ORDER — ONDANSETRON 2 MG/ML
4 INJECTION INTRAMUSCULAR; INTRAVENOUS EVERY 30 MIN PRN
Status: DISCONTINUED | OUTPATIENT
Start: 2025-03-19 | End: 2025-03-19 | Stop reason: HOSPADM

## 2025-03-19 RX ORDER — CEFAZOLIN SODIUM 1 G/3ML
1 INJECTION, POWDER, FOR SOLUTION INTRAMUSCULAR; INTRAVENOUS EVERY 8 HOURS
Status: DISCONTINUED | OUTPATIENT
Start: 2025-03-19 | End: 2025-03-19 | Stop reason: HOSPADM

## 2025-03-19 RX ORDER — FENTANYL CITRATE 50 UG/ML
25 INJECTION, SOLUTION INTRAMUSCULAR; INTRAVENOUS EVERY 5 MIN PRN
Status: DISCONTINUED | OUTPATIENT
Start: 2025-03-19 | End: 2025-03-19 | Stop reason: HOSPADM

## 2025-03-19 RX ORDER — OXYCODONE HYDROCHLORIDE 5 MG/1
5 TABLET ORAL EVERY 4 HOURS PRN
Status: DISCONTINUED | OUTPATIENT
Start: 2025-03-19 | End: 2025-03-19 | Stop reason: HOSPADM

## 2025-03-19 RX ORDER — ACETAMINOPHEN 325 MG/1
975 TABLET ORAL ONCE
Status: DISCONTINUED | OUTPATIENT
Start: 2025-03-19 | End: 2025-03-19 | Stop reason: HOSPADM

## 2025-03-19 RX ORDER — CEFAZOLIN SODIUM 1 G/3ML
1 INJECTION, POWDER, FOR SOLUTION INTRAMUSCULAR; INTRAVENOUS EVERY 8 HOURS
Status: DISCONTINUED | OUTPATIENT
Start: 2025-03-19 | End: 2025-03-19

## 2025-03-19 RX ORDER — POLYETHYLENE GLYCOL 3350 17 G/17G
17 POWDER, FOR SOLUTION ORAL DAILY
Status: DISCONTINUED | OUTPATIENT
Start: 2025-03-20 | End: 2025-03-19 | Stop reason: HOSPADM

## 2025-03-19 RX ORDER — HYDROMORPHONE HCL IN WATER/PF 6 MG/30 ML
0.1 PATIENT CONTROLLED ANALGESIA SYRINGE INTRAVENOUS EVERY 4 HOURS PRN
Status: DISCONTINUED | OUTPATIENT
Start: 2025-03-19 | End: 2025-03-19 | Stop reason: HOSPADM

## 2025-03-19 RX ORDER — AMOXICILLIN 250 MG
1-2 CAPSULE ORAL 2 TIMES DAILY
Qty: 30 TABLET | Refills: 0 | Status: SHIPPED | OUTPATIENT
Start: 2025-03-19

## 2025-03-19 RX ORDER — ASPIRIN 81 MG/1
81 TABLET ORAL 2 TIMES DAILY
Status: DISCONTINUED | OUTPATIENT
Start: 2025-03-19 | End: 2025-03-19 | Stop reason: HOSPADM

## 2025-03-19 RX ORDER — PROCHLORPERAZINE MALEATE 5 MG/1
5 TABLET ORAL EVERY 6 HOURS PRN
Status: DISCONTINUED | OUTPATIENT
Start: 2025-03-19 | End: 2025-03-19 | Stop reason: HOSPADM

## 2025-03-19 RX ORDER — LIDOCAINE 40 MG/G
CREAM TOPICAL
Status: DISCONTINUED | OUTPATIENT
Start: 2025-03-19 | End: 2025-03-19 | Stop reason: HOSPADM

## 2025-03-19 RX ORDER — AMOXICILLIN 250 MG
1 CAPSULE ORAL 2 TIMES DAILY
Status: DISCONTINUED | OUTPATIENT
Start: 2025-03-19 | End: 2025-03-19 | Stop reason: HOSPADM

## 2025-03-19 RX ORDER — OXYCODONE HYDROCHLORIDE 5 MG/1
5 TABLET ORAL EVERY 4 HOURS PRN
Qty: 20 TABLET | Refills: 0 | Status: SHIPPED | OUTPATIENT
Start: 2025-03-19

## 2025-03-19 RX ORDER — CEFADROXIL 500 MG/1
500 CAPSULE ORAL 2 TIMES DAILY
Qty: 14 CAPSULE | Refills: 0 | Status: SHIPPED | OUTPATIENT
Start: 2025-03-19 | End: 2025-03-26

## 2025-03-19 RX ORDER — ACETAMINOPHEN 325 MG/1
650 TABLET ORAL EVERY 4 HOURS PRN
Qty: 100 TABLET | Refills: 0 | Status: SHIPPED | OUTPATIENT
Start: 2025-03-19

## 2025-03-19 RX ORDER — IPRATROPIUM BROMIDE 21 UG/1
2 SPRAY, METERED NASAL EVERY 12 HOURS
COMMUNITY

## 2025-03-19 RX ADMIN — ACETAMINOPHEN 975 MG: 325 TABLET ORAL at 06:00

## 2025-03-19 RX ADMIN — CEFAZOLIN 1 G: 1 INJECTION, POWDER, FOR SOLUTION INTRAMUSCULAR; INTRAVENOUS at 13:30

## 2025-03-19 RX ADMIN — Medication 2 G: at 07:09

## 2025-03-19 RX ADMIN — SODIUM CHLORIDE, SODIUM LACTATE, POTASSIUM CHLORIDE, AND CALCIUM CHLORIDE: .6; .31; .03; .02 INJECTION, SOLUTION INTRAVENOUS at 06:34

## 2025-03-19 RX ADMIN — LIDOCAINE HYDROCHLORIDE 50 MG: 10 INJECTION, SOLUTION EPIDURAL; INFILTRATION; INTRACAUDAL; PERINEURAL at 07:23

## 2025-03-19 RX ADMIN — DEXAMETHASONE SODIUM PHOSPHATE 10 MG: 10 INJECTION, SOLUTION INTRAMUSCULAR; INTRAVENOUS at 07:23

## 2025-03-19 RX ADMIN — PHENYLEPHRINE HYDROCHLORIDE 0.5 MCG/KG/MIN: 10 INJECTION INTRAVENOUS at 08:04

## 2025-03-19 RX ADMIN — PROPOFOL 100 MCG/KG/MIN: 10 INJECTION, EMULSION INTRAVENOUS at 07:23

## 2025-03-19 RX ADMIN — SODIUM CHLORIDE, SODIUM LACTATE, POTASSIUM CHLORIDE, AND CALCIUM CHLORIDE: .6; .31; .03; .02 INJECTION, SOLUTION INTRAVENOUS at 08:06

## 2025-03-19 RX ADMIN — FENTANYL CITRATE 50 MCG: 0.05 INJECTION, SOLUTION INTRAMUSCULAR; INTRAVENOUS at 07:18

## 2025-03-19 RX ADMIN — ACETAMINOPHEN 975 MG: 325 TABLET ORAL at 13:07

## 2025-03-19 RX ADMIN — MEPIVACAINE HYDROCHLORIDE 3 ML: 15 INJECTION, SOLUTION EPIDURAL; INFILTRATION at 07:23

## 2025-03-19 RX ADMIN — TRANEXAMIC ACID 1950 MG: 650 TABLET ORAL at 06:00

## 2025-03-19 ASSESSMENT — ACTIVITIES OF DAILY LIVING (ADL)
ADLS_ACUITY_SCORE: 38
ADLS_ACUITY_SCORE: 30
ADLS_ACUITY_SCORE: 38

## 2025-03-19 ASSESSMENT — ENCOUNTER SYMPTOMS: DYSRHYTHMIAS: 1

## 2025-03-19 NOTE — PROGRESS NOTES
03/19/25 1304   Appointment Info   Signing Clinician's Name / Credentials (PT) Zaki Carr, PT   Quick Adds   Quick Adds Certification   Living Environment   People in Home spouse   Current Living Arrangements house   Home Accessibility stairs to enter home;stairs within home   Number of Stairs, Main Entrance 7   Stair Railings, Main Entrance railing on right side (ascending)   Number of Stairs, Within Home, Primary greater than 10 stairs   Stair Railings, Within Home, Primary railings safe and in good condition   Transportation Anticipated family or friend will provide   Self-Care   Usual Activity Tolerance good   Current Activity Tolerance moderate   Equipment Currently Used at Home cane, straight;walker, rolling   Fall history within last six months no   Activity/Exercise/Self-Care Comment Indep prior for all I ADL's and ADL's   General Information   Onset of Illness/Injury or Date of Surgery 03/19/25   Referring Physician Dr. Oden   Patient/Family Therapy Goals Statement (PT) to go home today   Pertinent History of Current Problem (include personal factors and/or comorbidities that impact the POC) s/p R JAREK  Paroxysmal tachycardia, unspecified (HRC) 01/24/2025    Benign prostatic hyperplasia with urinary frequency 12/04/2023   History of squamous cell carcinoma 02/23/2021      Overview Note:      SCCis, left anterior thigh, s/p shave 9/2020      History of basal cell carcinoma 09/24/2019      Overview Note:      BCC, left forehead s/p Mohs 10/2005      Abnormal ECG     PSVT (paroxysmal supraventricular tachycardia) (HRC)     Cortical age-related cataract of both eyes 04/24/2018   Nuclear sclerotic cataract of both eyes 04/24/2018   Essential hypertension (HRC) 07/07/2016      Overview Note:      With diet and exercise.  Significant improvement.  Still little bit high in clinic but better at home.  Given age and lack of heart history, monitor.  Do not st      Hypercholesterolemia 07/07/2016      Overview  Note:      Never took Pravachol as directed 2015.  Recheck with recent lifestyle changes.  No history of heart disease, diabetes or other vascular disease.      Glaucoma suspect of both eyes 04/01/2016   Myopia OU 04/01/2016   Existing Precautions/Restrictions no known precautions/restrictions   Weight-Bearing Status - RLE weight-bearing as tolerated   Cognition   Affect/Mental Status (Cognition) WFL   Range of Motion (ROM)   ROM Comment decreased ROM s/p R JAREK   Strength (Manual Muscle Testing)   Strength (Manual Muscle Testing) No deficits observed during functional mobility   Transfers   Transfers sit-stand transfer   Maintains Weight-bearing Status (Transfers) verbal cues to maintain   Sit-Stand Transfer   Sit-Stand Wright (Transfers) contact guard;verbal cues   Assistive Device (Sit-Stand Transfers) walker, front-wheeled   Gait/Stairs (Locomotion)   [place] Level (Gait) contact guard;verbal cues   Assistive Device (Gait) walker, front-wheeled   Distance in Feet (Gait) 20   Pattern (Gait) swing-through   Deviations/Abnormal Patterns (Gait) hang decreased;gait speed decreased   Balance   Balance no deficits were identified   Clinical Impression   Criteria for Skilled Therapeutic Intervention Yes, treatment indicated   PT Diagnosis (PT) impaired functional mobility   Influenced by the following impairments pain, decreased ROM, impaired balance, decreased strength   Functional limitations due to impairments gait, stairs, transfers   Clinical Presentation (PT Evaluation Complexity) stable   Clinical Presentation Rationale pt presents as medically diagnosed   Clinical Decision Making (Complexity) low complexity   Planned Therapy Interventions (PT) gait training;home exercise program;patient/family education;stair training;transfer training   Risk & Benefits of therapy have been explained care plan/treatment goals reviewed;patient   PT Total Evaluation Time   PT Eval, Low Complexity Minutes (90220) 10    Therapy Certification   Start of care date 03/19/25   Certification date from 03/19/25   Certification date to 03/19/25   Physical Therapy Goals   PT Frequency One time eval and treatment only   PT Predicted Duration/Target Date for Goal Attainment 03/19/25   PT Goals PT Goal 1;Gait;Transfers;Stairs   PT: Transfers Modified independent;Sit to/from stand;Assistive device;Within precautions;Goal Met   PT: Gait Modified independent;Rolling walker;Within precautions;100 feet;Goal Met   PT: Stairs Supervision/stand-by assist;Within precautions;6 stairs;Rail on right;Goal Met   PT: Goal 1 Independent with written HEP for LE strengthening and ROM, goal met   Interventions   Interventions Quick Adds Therapeutic Procedure;Therapeutic Activity;Gait Training   Therapeutic Procedure/Exercise   Ther. Procedure: strength, endurance, ROM, flexibillity Minutes (55255) 11   Symptoms Noted During/After Treatment none   Treatment Detail/Skilled Intervention JAREK protocol therex x10 reps, cueing for technique   Therapeutic Activity   Treatment Detail/Skilled Intervention sit to/from stand, cueing for safe hand placement and LE positioning,   Gait Training   Gait Training Minutes (48867) 12   Symptoms Noted During/After Treatment (Gait Training) none   Treatment Detail/Skilled Intervention slow stable gait, able to toilet self and adjust clothing, up/down 8 steps with SBA and R rail only.  Paitent will use cane at home when doing stairs.  VC for nonrecip steps on steps. Patient going up with L LE but preferring to go down with L LE despite cueing stating it is less painful.   Distance in Feet 150   [place] Level (Gait Training) independent   Physical Assistance Level (Gait Training) verbal cues   Weight Bearing (Gait Training) weight-bearing as tolerated   Assistive Device (Gait Training) rolling walker   Pattern Analysis (Gait Training) swing-through gait   Gait Analysis Deviations decreased hang;decreased step length    Impairments (Gait Analysis/Training) pain   Stair Railings present on right side   Physical Assist/Nonphysical Assist (Stairs) 1 person assist;verbal cues;supervision   Level of [place] (Stairs) stand-by assist   PT Discharge Planning   PT Plan dc PT   PT Discharge Recommendation (DC Rec) other (see comments)  (per ortho MD)   PT Rationale for DC Rec Paitent moving well overall and has good home setup and support   PT Brief overview of current status Patient independent with gait/transfers SBA on stairs   PT Total Distance Amb During Session (feet) 170   PT Equipment Needed at Discharge cane, straight;walker, rolling   Physical Therapy Time and Intention   Timed Code Treatment Minutes 23   Total Session Time (sum of timed and untimed services) 33   [facility]                                                                                   OUTPATIENT PHYSICAL THERAPY    PLAN OF TREATMENT FOR OUTPATIENT REHABILITATION   Patient's Last Name, First Name, Vik Cuba YOB: 1945   Provider's Name   [facility]   Medical Record No.  [id number]     Onset Date: 03/19/25 Start of Care Date: 03/19/25     Medical Diagnosis:                  PT Diagnosis:  impaired functional mobility Certification Dates:  From: 03/19/25  To: 03/19/25       See note for plan of treatment, functional goals, and certification details.    I CERTIFY THE NEED FOR THESE SERVICES FURNISHED UNDER        THIS PLAN OF TREATMENT AND WHILE UNDER MY CARE (Physician co-signature of this document indicates review and certification of the therapy plan).

## 2025-03-19 NOTE — OR NURSING
Pt meets all criteria for a fast track total joint discharge. Pain is controlled, tolerates PO without nausea, spinal anesthesia is completely worn off and pt has no motor impairment. He saw PT and was approved for discharge, has voided, and completed second IV antibiotic. Discharge instructions, Stoplight tool, and Ortho[facility] information card all provided to pts wife. Discharge instructions reviewed with pt and wife, they verbalize understanding and had no additional questions prior to discharge.

## 2025-03-19 NOTE — ANESTHESIA PROCEDURE NOTES
"Intrathecal injection Procedure Note    Pre-Procedure   Staff -        Anesthesiologist:  Hanane Landa MD       Performed By: anesthesiologist       Location: OR       Procedure Start/Stop Times: 3/19/2025 7:19 AM and 3/19/2025 7:23 AM       Pre-Anesthestic Checklist: patient identified, IV checked, risks and benefits discussed, informed consent, monitors and equipment checked and pre-op evaluation  Timeout:       Correct Patient: Yes        Correct Procedure: Yes        Correct Site: Yes        Correct Position: Yes   Procedure Documentation  Procedure: intrathecal injection         Patient Position: sitting       Patient Prep/Sterile Barriers: sterile gloves, mask, patient draped       Skin prep: Chloraprep       Insertion Site: L3-4. (midline approach).       Needle Gauge: 24.        Needle Length (Inches): 4        Spinal Needle Type: Pencan       Introducer used       Introducer: 20 G       # of attempts: 1 and  # of redirects:  0    Assessment/Narrative         Paresthesias: No.       CSF fluid: clear.    Medication(s) Administered   1.5% Mepivacaine PF (Intrathecal) - Intrathecal   3 mL - 3/19/2025 7:23:00 AM  Medication Administration Time: 3/19/2025 7:19 AM      FOR [facility] ([facility]) ONLY:   Pain Team Contact information: please page the Pain Team Via [employer]. Search \"Pain\". During daytime hours, please page the attending first. At night please page the resident first.      "

## 2025-03-19 NOTE — ANESTHESIA CARE TRANSFER NOTE
Patient: Vik Veliz    Procedure: Procedure(s):  RIGHT DIRECT ANTERIOR TOTAL HIP ARTHROPLASTY       Diagnosis: Osteoarthritis of right hip [M16.11]  Diagnosis Additional Information: No value filed.    Anesthesia Type:   Spinal     Note:    Oropharynx: oropharynx clear of all foreign objects and spontaneously breathing  Level of Consciousness: drowsy  Oxygen Supplementation: face mask  Level of Supplemental Oxygen (L/min / FiO2): 6  Independent Airway: airway patency satisfactory and stable  Dentition: dentition unchanged  Vital Signs Stable: post-procedure vital signs reviewed and stable  Report to RN Given: handoff report given  Patient transferred to: PACU    Handoff Report: Identifed the Patient, Identified the Reponsible Provider, Reviewed the pertinent medical history, Discussed the surgical course, Reviewed Intra-OP anesthesia mangement and issues during anesthesia, Set expectations for post-procedure period and Allowed opportunity for questions and acknowledgement of understanding  Vitals:  Vitals Value Taken Time   /59 03/19/25 0900   Temp 36.3  C (97.34  F) 03/19/25 0903   Pulse 60 03/19/25 0903   Resp 27 03/19/25 0903   SpO2 98 % 03/19/25 0903   Vitals shown include unfiled device data.    Electronically Signed By: PAOLA Harmon CRNA  March 19, 2025  9:04 AM

## 2025-03-19 NOTE — ANESTHESIA PREPROCEDURE EVALUATION
Anesthesia Pre-Procedure Evaluation    Patient: Vik Veliz   MRN: [id number] : 1945        Procedure : Procedure(s):  RIGHT DIRECT ANTERIOR TOTAL HIP ARTHROPLASTY          Past Medical History:   Diagnosis Date     Abnormality of gait due to impairment of balance      Arthritis      BPH (benign prostatic hyperplasia)      Complication of anesthesia      Essential tremor      Hypertension      Memory deficit      Status post left hip replacement       Past Surgical History:   Procedure Laterality Date     ARTHROPLASTY HIP ANTERIOR Left 2024    Procedure: LEFT DIRECT ANTERIOR TOTAL HIP ARTHROPLASTY;  Surgeon: Balaji Oden MD;  Location: [facility] Main OR      No Known Allergies   Social History     Tobacco Use     Smoking status: Never     Smokeless tobacco: Never   Substance Use Topics     Alcohol use: Yes     Comment: 14 etoh per week      Wt Readings from Last 1 Encounters:   25 77.6 kg (171 lb)        Anesthesia Evaluation   Pt has had prior anesthetic.     No history of anesthetic complications       ROS/MED HX  ENT/Pulmonary:  - neg pulmonary ROS     Neurologic:  - neg neurologic ROS     Cardiovascular: Comment: 24 Echo  Summary    1. Normal LV size with normal wall thickness. Calculated biplane LVEF 60-65%. No regional wall motion abnormalities.     2. Normal RV size with normal systolic function. Estimated PASP is normal.     3. Biatrial enlargement.     4. The aortic valve is trileaflet with mild regurgitation.     5. The aortic root and ascending aorta are mildly dilated at 4.2 cm and 4.1 cm, respectively.     6. Compared to the prior study from 2019, the current study reveals no significant change.         (+) Dyslipidemia hypertension- -   -  - -                        dysrhythmias (PSVT),              METS/Exercise Tolerance: >4 METS    Hematologic:  - neg hematologic  ROS     Musculoskeletal: Comment: Glaucoma  (+)  arthritis,             GI/Hepatic:  - neg GI/hepatic  "ROS     Renal/Genitourinary:     (+)        BPH,      Endo:  - neg endo ROS     Psychiatric/Substance Use:       Infectious Disease:       Malignancy:       Other:            Physical Exam    Airway        Mallampati: II   TM distance: > 3 FB   Neck ROM: full   Mouth opening: > 3 cm    Respiratory Devices and Support         Dental       (+) Multiple crowns, permanant bridges    B=Bridge, C=Chipped, L=Loose, M=Missing    Cardiovascular   cardiovascular exam normal          Pulmonary   pulmonary exam normal            OUTSIDE LABS:  CBC:   Lab Results   Component Value Date    HGB 12.0 (L) 05/14/2024     BMP:   Lab Results   Component Value Date     (H) 05/14/2024     COAGS: No results found for: \"PTT\", \"INR\", \"FIBR\"  POC: No results found for: \"BGM\", \"HCG\", \"HCGS\"  HEPATIC: No results found for: \"ALBUMIN\", \"PROTTOTAL\", \"ALT\", \"AST\", \"GGT\", \"ALKPHOS\", \"BILITOTAL\", \"BILIDIRECT\", \"MACK\"  OTHER: No results found for: \"PH\", \"LACT\", \"A1C\", \"MOLLY\", \"PHOS\", \"MAG\", \"LIPASE\", \"AMYLASE\", \"TSH\", \"T4\", \"T3\", \"CRP\", \"SED\"    Anesthesia Plan    ASA Status:  3    NPO Status:  NPO Appropriate    Anesthesia Type: Spinal.              Consents    Anesthesia Plan(s) and associated risks, benefits, and realistic alternatives discussed. Questions answered and patient/representative(s) expressed understanding.     - Discussed: Risks, Benefits and Alternatives for BOTH SEDATION and the PROCEDURE were discussed     - Discussed with:        - Patient is DNR/DNI Status: No     Use of blood products discussed: Yes.     - Discussed with: Patient.     - Consented: consented to blood products     Postoperative Care    Pain management: Multi-modal analgesia.   PONV prophylaxis: Ondansetron (or other 5HT-3), Dexamethasone or Solumedrol     Comments:    Other Comments: Keep MAP greater than 75             Hanane Landa MD    Clinically Significant Risk Factors Present on Admission                   # Hypertension: Home medication list " "includes antihypertensive(s)           # Overweight: Estimated body mass index is 25.25 kg/m  as calculated from the following:    Height as of this encounter: 1.753 m (5' 9\").    Weight as of this encounter: 77.6 kg (171 lb).                "

## 2025-03-19 NOTE — PHARMACY-ADMISSION MEDICATION HISTORY
Pharmacist Admission Medication History    Admission medication history is complete. The information provided in this note is only as accurate as the sources available at the time of the update.    Information Source(s): Patient and CareEverywhere/SureScripts via in-person    Pertinent Information: n/a    Allergies reviewed with patient and updates made in EHR: yes    Medication History Completed By: Madhuri Kam PharmTONYA 3/19/2025 7:07 AM    PTA Med List   Medication Sig Note Last Dose/Taking    acetaminophen (TYLENOL) 325 MG tablet Take 2 tablets (650 mg) by mouth every 4 hours as needed for other (mild pain)  3/17/2025    ipratropium (ATROVENT) 0.03 % nasal spray Spray 2 sprays into both nostrils every 12 hours. 3/19/2025: Not with 3/18/2025 Bedtime    lisinopril (ZESTRIL) 10 MG tablet Take 10 mg by mouth at bedtime  3/18/2025 Bedtime    simvastatin (ZOCOR) 20 MG tablet Take 20 mg by mouth at bedtime  3/18/2025 Bedtime    tamsulosin (FLOMAX) 0.4 MG capsule Take 0.8 mg by mouth daily  3/18/2025 Morning

## 2025-03-19 NOTE — OP NOTE
Operative Note    Name:  Vik Veliz  Location: [facility] Main OR  Procedure Date:  3/19/2025  PCP:  Dione Ennis      Procedure(s):  RIGHT DIRECT ANTERIOR TOTAL HIP ARTHROPLASTY     Implant Name Type Inv. Item Serial No.  Lot No. LRB No. Used Action   IMP LINER HOWM ACETABULUM 36MM 54-56MM 0DEG 621-00-36F - [id number] Total Joint Component/Insert IMP LINER HOWM ACETABULUM 36MM 54-56MM 0DEG 621-00-36F  [employer] [id number] Right 1 Implanted   SHELL ACETABULAR 58F SOLIDBACK TRITANIUM - [id number] Total Joint Component/Insert SHELL ACETABULAR 58F SOLIDBACK TRITANIUM  [employer] [id number] Right 1 Implanted   IMPLANT HIP 41MM (NK) INSG 8 HPSTM HI OFST 111MM [id number] - [id number] Total Joint Component/Insert IMPLANT HIP 41MM (NK) INSG 8 HPSTM HI OFST 111MM 0788-5121  [employer] [id number] Right 1 Implanted   IMP HEAD FEMORAL STRK BIOLOX DELTA CERAMIC 36MM +0MM - [id number] Total Joint Component/Insert IMP HEAD FEMORAL STRK BIOLOX DELTA CERAMIC 36MM +0MM  [employer] [id number] Right 1 Implanted       Pre-Procedure Diagnosis:  Osteoarthritis of right hip [M16.11]     Post-Procedure Diagnosis:    same    Surgeon(s):  Balaji Oden MD Franz, Dominique, PA-C    Assistants:  Required for patient positioning, intraoperative retraction, patient safety, and wound closure.    Anesthesia Type:  Spinal     Findings:  Arthritis    Operative Report:      After satisfactory infiltration of spinal block anesthetic, the patient was placed supine on the table. Both lower extremities were then prepped and draped in usual sterile fashion. A timeout was then conducted to ensure proper surgical site, after which a longitudinal incision was made just lateral and distal to the ASIS. The tensor fascia was then incised, and the muscle belly retracted lateral. The circumflex vessels were cauterized and divided. Blunt retractors then placed over the superior and inferior capsule, and an anterior  capsulectomy performed. The femoral neck cut was then made per preoperative templating. The disc of bone was removed from the femoral neck. The femoral head was removed with a corkscrew reamer. The femur was retracted posteriorly exposing the acetabulum. All soft tissues were removed from the acetabulum, and the acetabulum sequentially reamed to accommodate the shell. We reamed line to line. Impaction of the shell showed it had excellent scratch fit. The dome hole cover was placed. At this point an intraoperative standardized cocktail of was infiltrated around the periarticular soft tissues. The polythene liner was then snapped into place. The proximal femur was then delivered into the wound through a series of proximal femoral capsular releases, hip hyperextension, adduction, and external rotation. The femur was then sequentially broached to accommodate a size 8 stem, high offset is what we templated. Trial showed a +0  head to be the most appropriate for leg lengths.  There was  excellent stability. Trials were then removed, the wound copiously irrigated, and the stem impacted followed by the head. The hip was reduced, checked for interposition and impingement. The wound thoroughly irrigated once more. Bleeders cauterized. The wound was then closed in layers: 2-0 Vicryl for the tensor fascia, 2-0 Vicryl for the subcutaneous tendinous tissues, 3-0 Monocryl for the skin, followed by Dermabond. Sterile dressings were applied.  The patient was then awakened and taken to the [facility] stable.  The patient tolerated the procedure well. There were no apparent intraoperative complications. The plan is to have the patient weight-bear as tolerated, no postoperative hip precautions or restrictions. We'll place the patient on a multimodal DVT prophylaxis protocol including early ambulation, mechanical plexus, a chemical prophylaxis ×1 month      Estimated Blood Loss:   200cc         Drains:    none    Complications:     None    Balaji Oden MD     Date: 3/19/2025  Time: 8:37 AM

## 2025-03-19 NOTE — INTERVAL H&P NOTE
"I have reviewed the surgical (or preoperative) H&P that is linked to this encounter, and examined the patient. There are no significant changes    Clinical Conditions Present on Arrival:  Clinically Significant Risk Factors Present on Admission                       # Overweight: Estimated body mass index is 25.25 kg/m  as calculated from the following:    Height as of this encounter: 1.753 m (5' 9\").    Weight as of this encounter: 77.6 kg (171 lb).       "

## 2025-03-19 NOTE — ANESTHESIA POSTPROCEDURE EVALUATION
Patient: Vik Veliz    Procedure: Procedure(s):  RIGHT DIRECT ANTERIOR TOTAL HIP ARTHROPLASTY       Anesthesia Type:  Spinal    Note:  Disposition: Admission   Postop Pain Control: Uneventful            Sign Out: Well controlled pain   PONV: No   Neuro/Psych: Uneventful            Sign Out: Acceptable/Baseline neuro status   Airway/Respiratory: Uneventful            Sign Out: Acceptable/Baseline resp. status   CV/Hemodynamics: Uneventful            Sign Out: Acceptable CV status; No obvious hypovolemia; No obvious fluid overload   Other NRE: NONE   DID A NON-ROUTINE EVENT OCCUR? No           Last vitals:  Vitals Value Taken Time   /89 03/19/25 1131   Temp 36.3  C (97.3  F) 03/19/25 0950   Pulse 64 03/19/25 1144   Resp 22 03/19/25 0939   SpO2 98 % 03/19/25 1144   Vitals shown include unfiled device data.    Electronically Signed By: Hanane Landa MD  March 19, 2025  11:55 AM

## 2025-03-19 NOTE — CONSULTS
"[facility] Medicine Service Consult Note.     Physician requesting consult: Balaji Oden MD  Thank you, Balaji Ortiz MD, for asking the [facility] Medicine Service to see Vik Veliz in consultation.    Assessment/Recommendations   Assessment/Plan:   Vik Veliz is an 79-year-old male with history of right hip osteoarthritis status post right total hip arthroplasty on 3/19/2025.  Hospital medicine service was consulted for hypertension.        Hypertension, essential  - order lisinopril 10mg po daily  - order creatinine and potassium level for AM  - Hold parameters written, as patient is high risk of post operative hypotension.      BPH with urinary retention  PTA tamsulosin 0.8 mg daily  Postoperative bladder protocol.    Hypercholesterolemia  PTA simvastatin 20 mg at bedtime      Clinically Significant Risk Factors Present on Admission   # Hypertension: Home medication list includes antihypertensive(s)    # Overweight: Estimated body mass index is 25.25 kg/m  as calculated from the following:    Height as of this encounter: 1.753 m (5' 9\").    Weight as of this encounter: 77.6 kg (171 lb).           Code status:Full Code  -Reviewed the patient's preoperative H and P and updated missing elements.  -Home medication reconciliation has been reviewed.      History of Present Illness/Subjective    HPI: Mr. Vik Veliz is a 79 year old male status post Procedure(s):  RIGHT DIRECT ANTERIOR TOTAL HIP ARTHROPLASTY  Post-operative Day: Day of Surgery, hospital medicine was consulted for hypertension.    HTN. The patient had this problem for >5 years.  Perioperative blood pressure measured at 168/81 mmHg after surgery.   Associated symptoms as none. They don't occur after anything.   Not taking blood pressure medicine makes it worse. Taking blood pressure medicine makes it better.     Patient denies cerebrovascular accident, myocardial infarction, pulmonary embolism, or deep " "venous thrombosis.     Estimated Blood Loss:  200 mL    I have reviewed preop physical including past medical hx, family hx, social hx, surgical hx, medication hx.      Physical Examination  Review of Systems   VITALS: BP (!) 168/81   Pulse 65   Temp 97.3  F (36.3  C) (Temporal)   Resp 24   Ht 1.753 m (5' 9\")   Wt 77.6 kg (171 lb)   SpO2 97%   BMI 25.25 kg/m    BMI: Body mass index is 25.25 kg/m .  Wt Readings from Last 3 Encounters:   02/27/25 77.6 kg (171 lb)       Intake/Output Summary (Last 24 hours) at 3/19/2025 1313  Last data filed at 3/19/2025 1310  Gross per 24 hour   Intake 1800 ml   Output 650 ml   Net 1150 ml     General Appearance:   no acute distress.   ENT/Mouth: membranes moist, no oral lesions or bleeding gums.      EYES:  no scleral icterus, normal conjunctivae   Neck: no carotid bruits or thyromegaly   Chest/Lungs:   lungs are clear to auscultation, no rales or wheezing, equal chest wall expansion    Cardiovascular:   Regular. Normal S1/S2 heart sounds with no murmurs, rubs, or gallops.   Abdomen:  no organomegaly, masses, bruits, or tenderness; bowel sounds are present   Extremities: no cyanosis or clubbing   Skin: no xanthelasma, warm.    Neurologic: normal  bilateral, no tremors     Psychiatric: alert and oriented x3, calm     A 12 point comprehensive review of systems was negative except as noted above.         Medical History  Surgical History Family History Social History   Patient Active Problem List    Diagnosis Date Noted    Osteoarthritis of right hip 03/19/2025     Priority: Medium    Osteoarthritis of left hip 05/13/2024     Priority: Medium    Past Surgical History:   Procedure Laterality Date    ARTHROPLASTY HIP ANTERIOR Left 5/13/2024    Procedure: LEFT DIRECT ANTERIOR TOTAL HIP ARTHROPLASTY;  Surgeon: Balaji Oden MD;  Location: [facility] OR     [facility], and family history is not on file.     Reviewed, and he  reports that he has never smoked. He has never used " smokeless tobacco. He reports current alcohol use. He reports that he does not use drugs.  Social History     Tobacco Use    Smoking status: Never    Smokeless tobacco: Never   Substance Use Topics    Alcohol use: Yes     Comment: 14 etoh per week (at least per wife)        Medications  Allergies   Medications Prior to Admission   Medication Sig Dispense Refill Last Dose/Taking    ipratropium (ATROVENT) 0.03 % nasal spray Spray 2 sprays into both nostrils every 12 hours.   3/18/2025 Bedtime    lisinopril (ZESTRIL) 10 MG tablet Take 10 mg by mouth at bedtime   3/18/2025 Bedtime    simvastatin (ZOCOR) 20 MG tablet Take 20 mg by mouth at bedtime   3/18/2025 Bedtime    tamsulosin (FLOMAX) 0.4 MG capsule Take 0.8 mg by mouth daily   3/18/2025 Morning    [DISCONTINUED] acetaminophen (TYLENOL) 325 MG tablet Take 2 tablets (650 mg) by mouth every 4 hours as needed for other (mild pain) 100 tablet 0 3/17/2025     No Known Allergies      Cardiographics Reviewed Personally By Myself   EKG Results: personally reviewed.  3/11/25, NSR, no ischemic changes.     Imaging Reviewed Personally By Myself    Radiology Results:   Recent Results (from the past 24 hours)   XR Pelvis w Hip Port Right 1 View    Narrative    EXAM: XR PELVIS AND HIP PORTABLE RIGHT 1 VIEW  LOCATION: [facility]  DATE: 3/19/2025    INDICATION: Status post Hip surgery  COMPARISON: None.      Impression    IMPRESSION: Postoperative changes bilateral total hip arthroplasty. Components appear well seated. Post procedural air around the right hip. Visualized pelvis negative for fracture.       Labs Reviewed Personally By Myself     Results for orders placed or performed during the hospital encounter of 03/19/25 (from the past 24 hours)   XR Pelvis w Hip Port Right 1 View    Narrative    EXAM: XR PELVIS AND HIP PORTABLE RIGHT 1 VIEW  LOCATION: [facility]  DATE: 3/19/2025    INDICATION: Status post Hip  surgery  COMPARISON: None.      Impression    IMPRESSION: Postoperative changes bilateral total hip arthroplasty. Components appear well seated. Post procedural air around the right hip. Visualized pelvis negative for fracture.       Thank you for this consultation.  Appreciate the opportunity to participate in the care of Vik Veliz, please feel free to contact us for any questions or concerns.    Leonard Chen DO, MS  [facility] Service  Internal Medicine  Phone: #[phone]

## 2025-05-18 ENCOUNTER — HEALTH MAINTENANCE LETTER (OUTPATIENT)
Age: 80
End: 2025-05-18

## (undated) DEVICE — HOOD SURG T7 PLUS STRL LF DISP 0416-801-200

## (undated) DEVICE — SYR 30ML LL W/O NDL 302832

## (undated) DEVICE — PREP CHLORAPREP 26ML TINTED HI-LITE ORANGE 930815

## (undated) DEVICE — DRAPE SHEET REV FOLD 3/4 9349

## (undated) DEVICE — CUSTOM PACK GEN MAJOR SBA5BGMHEA

## (undated) DEVICE — HOLDER LIMB VELCRO OR 0814-1533

## (undated) DEVICE — Device

## (undated) DEVICE — DRAPE BACK TABLE PADDED 60X90

## (undated) DEVICE — SOL WATER IRRIG 1000ML BOTTLE 2F7114

## (undated) DEVICE — SUCTION MANIFOLD NEPTUNE 2 SYS 1 PORT 702-025-000

## (undated) DEVICE — SUCTION TIP FLEXI CLEAR TIP DISP K62

## (undated) DEVICE — GLOVE BIOGEL PI SZ 7.5 40875

## (undated) DEVICE — ESU ELEC BLADE 6" COATED E1450-6

## (undated) DEVICE — DRAPE SHEET BILAT LIMB W/ ARM COVERS

## (undated) DEVICE — PITCHER STERILE 1000ML  SSK9004A

## (undated) DEVICE — BLADE PRECISION 25X1.27X9 6725127090

## (undated) DEVICE — VIAL DECANTER STERILE WHITE DYNJDEC06

## (undated) DEVICE — DRAPE STOCKINETTE IMPERVIOUS 12" 1587

## (undated) DEVICE — SUTURE MONOCRYL 3-0 18 PS2 UND MCP497G

## (undated) DEVICE — SOL NACL 0.9% IRRIG 1000ML BOTTLE 2F7124

## (undated) DEVICE — NEEDLE HYPO 18X1-1/2 SAFETY 305918

## (undated) DEVICE — DRAPE CONVERTORS U-DRAPE 60X72" 8476

## (undated) DEVICE — ELECTRODE PATIENT RETURN ADULT L10 FT 2 PLATE CORD 0855C

## (undated) DEVICE — DRAPE IOBAN INCISE 23X17" 6650EZ

## (undated) DEVICE — DRAPE STERI U 1015

## (undated) DEVICE — DRAPE STERI TOWEL LG 1010

## (undated) DEVICE — SUTURE VICRYL+ 2-0 27IN CT-1 UND VCP259H

## (undated) DEVICE — DECANTER VIAL 2006S

## (undated) DEVICE — GLOVE BIOGEL INDICATOR 7.5 LF 41675

## (undated) DEVICE — PACK MINOR SINGLE BASIN SSK3001

## (undated) DEVICE — ESU PENCIL SMOKE EVAC W/ROCKER SWITCH 0703-047-000

## (undated) RX ORDER — LIDOCAINE HYDROCHLORIDE 10 MG/ML
INJECTION, SOLUTION EPIDURAL; INFILTRATION; INTRACAUDAL; PERINEURAL
Status: DISPENSED
Start: 2025-03-19

## (undated) RX ORDER — DEXAMETHASONE SODIUM PHOSPHATE 10 MG/ML
INJECTION, SOLUTION INTRAMUSCULAR; INTRAVENOUS
Status: DISPENSED
Start: 2025-03-19

## (undated) RX ORDER — PHENYLEPHRINE HYDROCHLORIDE 10 MG/ML
INJECTION INTRAVENOUS
Status: DISPENSED
Start: 2025-03-19

## (undated) RX ORDER — ONDANSETRON 2 MG/ML
INJECTION INTRAMUSCULAR; INTRAVENOUS
Status: DISPENSED
Start: 2025-03-19

## (undated) RX ORDER — FENTANYL CITRATE 50 UG/ML
INJECTION, SOLUTION INTRAMUSCULAR; INTRAVENOUS
Status: DISPENSED
Start: 2025-03-19